# Patient Record
Sex: FEMALE | Race: WHITE | Employment: FULL TIME | URBAN - METROPOLITAN AREA
[De-identification: names, ages, dates, MRNs, and addresses within clinical notes are randomized per-mention and may not be internally consistent; named-entity substitution may affect disease eponyms.]

---

## 2017-01-30 ENCOUNTER — TELEPHONE (OUTPATIENT)
Dept: FAMILY MEDICINE CLINIC | Age: 57
End: 2017-01-30

## 2017-01-30 DIAGNOSIS — G47.419 PRIMARY NARCOLEPSY WITHOUT CATAPLEXY: ICD-10-CM

## 2017-01-30 DIAGNOSIS — Z13.220 SCREENING, LIPID: ICD-10-CM

## 2017-01-30 DIAGNOSIS — Z13.1 SCREENING FOR DIABETES MELLITUS (DM): Primary | ICD-10-CM

## 2017-01-30 DIAGNOSIS — Z13.29 SCREENING FOR THYROID DISORDER: ICD-10-CM

## 2017-02-07 ENCOUNTER — NURSE ONLY (OUTPATIENT)
Dept: FAMILY MEDICINE CLINIC | Age: 57
End: 2017-02-07

## 2017-02-07 DIAGNOSIS — Z13.1 SCREENING FOR DIABETES MELLITUS (DM): ICD-10-CM

## 2017-02-07 DIAGNOSIS — G47.419 PRIMARY NARCOLEPSY WITHOUT CATAPLEXY: ICD-10-CM

## 2017-02-07 DIAGNOSIS — Z13.29 SCREENING FOR THYROID DISORDER: ICD-10-CM

## 2017-02-07 DIAGNOSIS — Z13.220 SCREENING, LIPID: ICD-10-CM

## 2017-02-07 LAB
ALBUMIN SERPL-MCNC: 4.4 G/DL (ref 3.4–5)
ANION GAP SERPL CALCULATED.3IONS-SCNC: 14 MMOL/L (ref 3–16)
BUN BLDV-MCNC: 16 MG/DL (ref 7–20)
CALCIUM SERPL-MCNC: 9.2 MG/DL (ref 8.3–10.6)
CHLORIDE BLD-SCNC: 103 MMOL/L (ref 99–110)
CHOLESTEROL, TOTAL: 164 MG/DL (ref 0–199)
CO2: 25 MMOL/L (ref 21–32)
CREAT SERPL-MCNC: 0.7 MG/DL (ref 0.6–1.1)
GFR AFRICAN AMERICAN: >60
GFR NON-AFRICAN AMERICAN: >60
GLUCOSE BLD-MCNC: 92 MG/DL (ref 70–99)
HCT VFR BLD CALC: 39.5 % (ref 36–48)
HDLC SERPL-MCNC: 70 MG/DL (ref 40–60)
HEMOGLOBIN: 13.4 G/DL (ref 12–16)
LDL CHOLESTEROL CALCULATED: 85 MG/DL
MCH RBC QN AUTO: 30.1 PG (ref 26–34)
MCHC RBC AUTO-ENTMCNC: 33.8 G/DL (ref 31–36)
MCV RBC AUTO: 88.9 FL (ref 80–100)
PDW BLD-RTO: 13.9 % (ref 12.4–15.4)
PHOSPHORUS: 3.8 MG/DL (ref 2.5–4.9)
PLATELET # BLD: 193 K/UL (ref 135–450)
PMV BLD AUTO: 9.1 FL (ref 5–10.5)
POTASSIUM SERPL-SCNC: 4.1 MMOL/L (ref 3.5–5.1)
RBC # BLD: 4.44 M/UL (ref 4–5.2)
SODIUM BLD-SCNC: 142 MMOL/L (ref 136–145)
TRIGL SERPL-MCNC: 45 MG/DL (ref 0–150)
TSH REFLEX: 2.68 UIU/ML (ref 0.27–4.2)
VLDLC SERPL CALC-MCNC: 9 MG/DL
WBC # BLD: 4.4 K/UL (ref 4–11)

## 2017-02-07 PROCEDURE — 36415 COLL VENOUS BLD VENIPUNCTURE: CPT | Performed by: INTERNAL MEDICINE

## 2017-02-13 ENCOUNTER — OFFICE VISIT (OUTPATIENT)
Dept: FAMILY MEDICINE CLINIC | Age: 57
End: 2017-02-13

## 2017-02-13 VITALS
DIASTOLIC BLOOD PRESSURE: 82 MMHG | OXYGEN SATURATION: 99 % | WEIGHT: 200 LBS | SYSTOLIC BLOOD PRESSURE: 128 MMHG | HEIGHT: 67 IN | HEART RATE: 70 BPM | BODY MASS INDEX: 31.39 KG/M2

## 2017-02-13 DIAGNOSIS — Z00.00 ANNUAL PHYSICAL EXAM: Primary | ICD-10-CM

## 2017-02-13 DIAGNOSIS — Z11.59 NEED FOR HEPATITIS C SCREENING TEST: ICD-10-CM

## 2017-02-13 PROCEDURE — 99396 PREV VISIT EST AGE 40-64: CPT | Performed by: INTERNAL MEDICINE

## 2017-02-13 RX ORDER — MAGNESIUM OXIDE 400 MG/1
400 TABLET ORAL DAILY
COMMUNITY
End: 2019-03-08

## 2017-02-14 LAB — HEPATITIS C ANTIBODY INTERPRETATION: NORMAL

## 2017-02-20 ENCOUNTER — HOSPITAL ENCOUNTER (OUTPATIENT)
Dept: MAMMOGRAPHY | Age: 57
Discharge: HOME | End: 2017-02-20
Admitting: INTERNAL MEDICINE

## 2017-02-20 DIAGNOSIS — Z00.00 ANNUAL PHYSICAL EXAM: ICD-10-CM

## 2017-10-24 ENCOUNTER — TELEPHONE (OUTPATIENT)
Dept: FAMILY MEDICINE CLINIC | Age: 57
End: 2017-10-24

## 2017-10-25 NOTE — TELEPHONE ENCOUNTER
Schedule OV with us first, if looks concerning we can get her in more quickly, otherwise it can be 2 to 4 months before an appointment is available.

## 2017-10-31 ENCOUNTER — OFFICE VISIT (OUTPATIENT)
Dept: FAMILY MEDICINE CLINIC | Age: 57
End: 2017-10-31

## 2017-10-31 VITALS
SYSTOLIC BLOOD PRESSURE: 126 MMHG | WEIGHT: 197 LBS | DIASTOLIC BLOOD PRESSURE: 78 MMHG | BODY MASS INDEX: 30.92 KG/M2 | OXYGEN SATURATION: 99 % | HEART RATE: 88 BPM | HEIGHT: 67 IN | TEMPERATURE: 97.4 F

## 2017-10-31 DIAGNOSIS — L82.1 SEBORRHEIC KERATOSIS: ICD-10-CM

## 2017-10-31 PROCEDURE — 99212 OFFICE O/P EST SF 10 MIN: CPT | Performed by: NURSE PRACTITIONER

## 2017-10-31 RX ORDER — CHLORAL HYDRATE 500 MG
3000 CAPSULE ORAL 3 TIMES DAILY
COMMUNITY
End: 2019-06-19 | Stop reason: ALTCHOICE

## 2017-10-31 NOTE — PATIENT INSTRUCTIONS
upper arms, and your palms. ¨ Look at your feet, the soles of your feet, and the spaces between your toes. ¨ Use a hand mirror to look at the back of your legs, the back of your neck, and your back, rear end (buttocks), and genital area. Part the hair on your head to look at your scalp. · If you see a change in a skin growth, contact your doctor. Look for:  ¨ A mole that bleeds. ¨ A fast-growing mole. ¨ A scaly or crusted growth on the skin. ¨ A sore that will not heal.  When should you call for help? Call your doctor now or seek immediate medical care if:  · You have an area of normal skin that suddenly changes in shape, size, or how it looks. · Your skin is badly broken from scratching. · You have signs of infection such as:  ¨ Pain, warmth, or swelling in your skin. ¨ Red streaks near a wound in your skin. ¨ Pus coming from a wound in your skin. ¨ A fever not due to the flu or other illness. Watch closely for changes in your health, and be sure to contact your doctor if:  · You do not get better as expected. Where can you learn more? Go to [email or web address] org and sign in to your [employer] account. Enter C016 in the [facility] box to learn more about \"Seborrheic Keratosis: Care Instructions. \"     If you do not have an account, please click on the \"Sign Up Now\" link. Current as of: October 13, 2016  Content Version: 11.3  © 7459-2815 [employer]. Care instructions adapted under license by [facility] ([facility]). If you have questions about a medical condition or this instruction, always ask your healthcare professional. [place] [address] any warranty or liability for your use of this information.

## 2017-10-31 NOTE — PROGRESS NOTES
Subjective:      Patient ID: Ayala Klein is a 61 y.o. female. HPI     Chief Complaint   Patient presents with    Nevus     L shoulder- referral to derm     Skin lesion- feels suddenly appeared. Felt was red around it, now just scally looking, no pain, no bleeding. No personal or family hx of skin ca. Past medical, surgical, family and social history were reviewed and updated with the patient. Review of Systems  Pertinent items noted in HPI    Objective:   Physical Exam   Constitutional: She is oriented to person, place, and time. She appears well-developed and well-nourished. No distress. Neurological: She is alert and oriented to person, place, and time. Skin:   Left upper anterior chest with about pencil eraser oval shape slightly elevated flesh color, uniform in color, scaly. Nursing note and vitals reviewed. Vitals:    10/31/17 1355   BP: 126/78   Site: Right Arm   Position: Sitting   Cuff Size: Medium Adult   Pulse: 88   Temp: 97.4 °F (36.3 °C)   TempSrc: Oral   SpO2: 99%   Weight: 197 lb (89.4 kg)   Height: 5' 7\" (1.702 m)     Assessment:      1. Seborrheic keratosis          Plan:      Dar Pugh was seen today for nevus.     Diagnoses and all orders for this visit:    Seborrheic keratosis    - benign appearing lesion, follow-up if changes

## 2018-01-29 ENCOUNTER — OFFICE VISIT (OUTPATIENT)
Dept: FAMILY MEDICINE CLINIC | Age: 58
End: 2018-01-29

## 2018-01-29 VITALS
OXYGEN SATURATION: 99 % | WEIGHT: 202.4 LBS | HEART RATE: 79 BPM | SYSTOLIC BLOOD PRESSURE: 132 MMHG | RESPIRATION RATE: 16 BRPM | DIASTOLIC BLOOD PRESSURE: 84 MMHG | TEMPERATURE: 99 F | BODY MASS INDEX: 31.7 KG/M2

## 2018-01-29 DIAGNOSIS — J40 BRONCHITIS: Primary | ICD-10-CM

## 2018-01-29 DIAGNOSIS — R06.2 WHEEZING: ICD-10-CM

## 2018-01-29 DIAGNOSIS — R05.9 COUGH: ICD-10-CM

## 2018-01-29 PROCEDURE — 99213 OFFICE O/P EST LOW 20 MIN: CPT | Performed by: NURSE PRACTITIONER

## 2018-01-29 RX ORDER — CEFDINIR 300 MG/1
300 CAPSULE ORAL 2 TIMES DAILY
Qty: 14 CAPSULE | Refills: 0 | Status: SHIPPED | OUTPATIENT
Start: 2018-01-29 | End: 2018-02-05

## 2018-01-29 RX ORDER — BENZONATATE 100 MG/1
100 CAPSULE ORAL 3 TIMES DAILY PRN
Qty: 30 CAPSULE | Refills: 0 | Status: SHIPPED | OUTPATIENT
Start: 2018-01-29 | End: 2019-01-24 | Stop reason: CLARIF

## 2018-01-29 RX ORDER — METHYLPREDNISOLONE 4 MG/1
TABLET ORAL
Qty: 1 KIT | Refills: 0 | Status: SHIPPED | OUTPATIENT
Start: 2018-01-29 | End: 2018-02-04

## 2018-01-29 ASSESSMENT — ENCOUNTER SYMPTOMS
COUGH: 1
CONSTIPATION: 0
NAUSEA: 0
CHEST TIGHTNESS: 0

## 2018-01-30 NOTE — PROGRESS NOTES
Subjective:      Patient ID: Wilmar Pinzon is a 61 y.o. female. HPI     2 months of PND. Treated with alkaseltzer cold and flu. 2 weeks ago started with cough, non productive. Review of Systems   Constitutional: Negative for appetite change, chills and fever. HENT: Positive for postnasal drip. Respiratory: Positive for cough. Negative for chest tightness. Cardiovascular: Negative for chest pain. Gastrointestinal: Negative for constipation and nausea. Neurological: Negative for dizziness and headaches. Psychiatric/Behavioral: Negative. Objective:   Physical Exam   Constitutional: She is oriented to person, place, and time. She appears well-developed and well-nourished. No distress. HENT:   Head: Normocephalic and atraumatic. Right Ear: Tympanic membrane and ear canal normal.   Left Ear: Tympanic membrane and ear canal normal.   Nose: Mucosal edema and rhinorrhea present. Mouth/Throat: Uvula is midline and oropharynx is clear and moist.   Neck: Normal range of motion. Neck supple. Cardiovascular: Normal rate, regular rhythm and normal heart sounds. Pulmonary/Chest: Effort normal. No respiratory distress. She has wheezes. She has no rales. Faint wheezing posterior. Frequent dry cough. Abdominal: Soft. Bowel sounds are normal. She exhibits no distension. Musculoskeletal: Normal range of motion. She exhibits no edema. Lymphadenopathy:     She has no cervical adenopathy. Neurological: She is alert and oriented to person, place, and time. Skin: Skin is warm. Psychiatric: She has a normal mood and affect. Her behavior is normal.       Assessment:      1. Bronchitis  2. Cough  3. Wheezing-faint      Plan:      1. Increase fluids  2. mucinex DM, delsym, or robitussin DM/CF  3. Tylenol or Ibuprofen TID as needed  4. Caitie Benítez was seen today for uri. Diagnoses and all orders for this visit:    Bronchitis  -     cefdinir (OMNICEF) 300 MG capsule;  Take 1 capsule by mouth 2 times daily for 7 days    Cough  -     benzonatate (TESSALON PERLES) 100 MG capsule; Take 1 capsule by mouth 3 times daily as needed for Cough    Wheezing  -     methylPREDNISolone (MEDROL, GALA,) 4 MG tablet;  As directed

## 2018-02-09 ENCOUNTER — TELEPHONE (OUTPATIENT)
Dept: FAMILY MEDICINE CLINIC | Age: 58
End: 2018-02-09

## 2018-02-09 RX ORDER — AZITHROMYCIN 250 MG/1
TABLET, FILM COATED ORAL
Qty: 1 PACKET | Refills: 0 | Status: SHIPPED | OUTPATIENT
Start: 2018-02-09 | End: 2018-02-19

## 2018-02-14 ENCOUNTER — TELEPHONE (OUTPATIENT)
Dept: FAMILY MEDICINE CLINIC | Age: 58
End: 2018-02-14

## 2018-02-16 ENCOUNTER — OFFICE VISIT (OUTPATIENT)
Dept: FAMILY MEDICINE CLINIC | Age: 58
End: 2018-02-16

## 2018-02-16 ENCOUNTER — HOSPITAL ENCOUNTER (OUTPATIENT)
Dept: GENERAL RADIOLOGY | Age: 58
Discharge: HOME | End: 2018-02-16

## 2018-02-16 VITALS
BODY MASS INDEX: 31.42 KG/M2 | HEART RATE: 78 BPM | WEIGHT: 200.6 LBS | OXYGEN SATURATION: 99 % | DIASTOLIC BLOOD PRESSURE: 78 MMHG | SYSTOLIC BLOOD PRESSURE: 130 MMHG

## 2018-02-16 DIAGNOSIS — R05.3 CHRONIC COUGH: Primary | ICD-10-CM

## 2018-02-16 DIAGNOSIS — R05.3 CHRONIC COUGH: ICD-10-CM

## 2018-02-16 PROCEDURE — 99214 OFFICE O/P EST MOD 30 MIN: CPT | Performed by: INTERNAL MEDICINE

## 2018-02-16 RX ORDER — PREDNISONE 20 MG/1
20 TABLET ORAL DAILY
Qty: 5 TABLET | Refills: 0 | Status: SHIPPED | OUTPATIENT
Start: 2018-02-16 | End: 2019-03-08

## 2018-02-16 RX ORDER — INHALER, ASSIST DEVICES
SPACER (EA) MISCELLANEOUS
Qty: 1 DEVICE | Refills: 0 | Status: SHIPPED | OUTPATIENT
Start: 2018-02-16 | End: 2019-03-08

## 2018-02-16 RX ORDER — ALBUTEROL SULFATE 90 UG/1
2 AEROSOL, METERED RESPIRATORY (INHALATION) EVERY 6 HOURS PRN
Qty: 1 INHALER | Refills: 3 | Status: SHIPPED | OUTPATIENT
Start: 2018-02-16 | End: 2019-03-08

## 2018-02-16 RX ORDER — PSEUDOEPHEDRINE HCL 120 MG/1
120 TABLET, FILM COATED, EXTENDED RELEASE ORAL EVERY 12 HOURS
COMMUNITY
End: 2019-04-29 | Stop reason: ALTCHOICE

## 2018-02-16 ASSESSMENT — PATIENT HEALTH QUESTIONNAIRE - PHQ9
2. FEELING DOWN, DEPRESSED OR HOPELESS: 0
SUM OF ALL RESPONSES TO PHQ QUESTIONS 1-9: 0
SUM OF ALL RESPONSES TO PHQ9 QUESTIONS 1 & 2: 0
1. LITTLE INTEREST OR PLEASURE IN DOING THINGS: 0

## 2018-02-20 ENCOUNTER — HOSPITAL ENCOUNTER (OUTPATIENT)
Dept: MAMMOGRAPHY | Age: 58
Discharge: HOME | End: 2018-02-20
Admitting: OBSTETRICS & GYNECOLOGY

## 2018-02-20 DIAGNOSIS — Z12.39 BREAST CANCER SCREENING: ICD-10-CM

## 2018-02-22 ASSESSMENT — ENCOUNTER SYMPTOMS
RHINORRHEA: 0
COUGH: 1
SORE THROAT: 0
SHORTNESS OF BREATH: 1
NAUSEA: 0
WHEEZING: 0

## 2018-02-22 NOTE — PROGRESS NOTES
Wheezing  -     Spacer/Aero-Holding Chambers (POCKET SPACER) BRYON; Use with albuterol. Plan:      As above and per orders.

## 2018-02-23 ENCOUNTER — OFFICE VISIT (OUTPATIENT)
Dept: FAMILY MEDICINE CLINIC | Age: 58
End: 2018-02-23

## 2018-02-23 VITALS
HEART RATE: 72 BPM | DIASTOLIC BLOOD PRESSURE: 80 MMHG | SYSTOLIC BLOOD PRESSURE: 120 MMHG | OXYGEN SATURATION: 98 % | BODY MASS INDEX: 32.43 KG/M2 | HEIGHT: 66 IN | WEIGHT: 201.8 LBS

## 2018-02-23 DIAGNOSIS — Z11.4 ENCOUNTER FOR SCREENING FOR HIV: ICD-10-CM

## 2018-02-23 DIAGNOSIS — Z00.00 ANNUAL PHYSICAL EXAM: Primary | ICD-10-CM

## 2018-02-23 DIAGNOSIS — Z13.29 THYROID DISORDER SCREEN: ICD-10-CM

## 2018-02-23 DIAGNOSIS — M79.644 PAIN OF RIGHT THUMB: ICD-10-CM

## 2018-02-23 DIAGNOSIS — R05.3 CHRONIC COUGH: ICD-10-CM

## 2018-02-23 LAB
HCT VFR BLD CALC: 41 % (ref 36–48)
HEMOGLOBIN: 14.2 G/DL (ref 12–16)
MCH RBC QN AUTO: 31.6 PG (ref 26–34)
MCHC RBC AUTO-ENTMCNC: 34.6 G/DL (ref 31–36)
MCV RBC AUTO: 91.2 FL (ref 80–100)
PDW BLD-RTO: 13.9 % (ref 12.4–15.4)
PLATELET # BLD: 246 K/UL (ref 135–450)
PMV BLD AUTO: 9 FL (ref 5–10.5)
RBC # BLD: 4.5 M/UL (ref 4–5.2)
WBC # BLD: 6.1 K/UL (ref 4–11)

## 2018-02-23 PROCEDURE — 99396 PREV VISIT EST AGE 40-64: CPT | Performed by: PHYSICIAN ASSISTANT

## 2018-02-23 PROCEDURE — 36415 COLL VENOUS BLD VENIPUNCTURE: CPT | Performed by: PHYSICIAN ASSISTANT

## 2018-02-23 ASSESSMENT — ENCOUNTER SYMPTOMS
RHINORRHEA: 0
NAUSEA: 0
ABDOMINAL PAIN: 0
CONSTIPATION: 0
VOMITING: 0
COUGH: 0
DIARRHEA: 0
SORE THROAT: 0
SHORTNESS OF BREATH: 0

## 2018-02-23 NOTE — PROGRESS NOTES
2018  Mercy Bond (: 1960)  61 y.o.      HPI    Here for annual physical. States that she is doing well. Had annual lipid panel with life line screening and it was normal. Also had carotid dopplers, awaiting report. Mammogram performed and results reviewed:showed her baseline fibroglandular breasts without mass, calcification deposit, or structural abnormalities. Follows a heart healthy diet, with lots of fruits and vegetables decreased carbs/fats/sugars. Exercises by walking, limited with the weather. Has been having pain in her left thumb joint with increased redness and minimal swelling. Review of Systems   Constitutional: Negative for activity change, chills and fever. HENT: Negative for congestion, ear pain, rhinorrhea and sore throat. Eyes: Negative for visual disturbance. Respiratory: Negative for cough and shortness of breath. Cardiovascular: Negative for chest pain and palpitations. Gastrointestinal: Negative for abdominal pain, constipation, diarrhea, nausea and vomiting. Genitourinary: Negative for difficulty urinating and dysuria. Musculoskeletal: Negative for arthralgias and myalgias. Skin: Negative for rash. Cysts on scalp   Neurological: Negative for dizziness, weakness and numbness. Psychiatric/Behavioral: Negative for sleep disturbance. Allergies, past medical history, family history, and social history reviewed and unchanged from previous encounter. Current Outpatient Prescriptions   Medication Sig Dispense Refill    SAFFLOWER OIL PO Take 4,500 mg by mouth       Omega-3 Fatty Acids (FISH OIL) 1000 MG CAPS Take 3,000 mg by mouth 3 times daily      magnesium oxide (MAG-OX) 400 MG tablet Take 400 mg by mouth daily      vitamin D (CHOLECALCIFEROL) 1000 UNIT TABS tablet Take 1,000 Units by mouth 2 times daily.  b complex vitamins capsule Take 1 capsule by mouth daily.       pseudoephedrine (SUDAFED 12 HR) 120 MG extended Skin is warm and dry. No rash noted. cysts palpable on scalp, abdomen, and right arm. They are firm and mobile. Psychiatric: She has a normal mood and affect. ASSESSMENT and PLAN:  Dave Singleton was seen today for annual exam.    Diagnoses and all orders for this visit:    Annual physical exam  -     CBC    Encounter for screening for HIV  -     HIV-1 AND HIV-2 ANTIBODIES; Future    Pain of right thumb  -     URIC ACID; Future    Thyroid disorder screen  -     TSH with Reflex; Future    Chronic cough  -     CBC;  Future  -    Previously ordered, not performed, FULL PFT STUDY WITH PRE AND POST as needed       Return in about 1 year (around 2/23/2019) for Annual Exam.

## 2018-02-24 LAB
TSH REFLEX: 1.68 UIU/ML (ref 0.27–4.2)
URIC ACID, SERUM: 4.4 MG/DL (ref 2.6–6)

## 2018-08-27 ENCOUNTER — TELEPHONE (OUTPATIENT)
Dept: FAMILY MEDICINE CLINIC | Age: 58
End: 2018-08-27

## 2018-08-27 DIAGNOSIS — R19.7 DIARRHEA, UNSPECIFIED TYPE: Primary | ICD-10-CM

## 2018-08-27 NOTE — TELEPHONE ENCOUNTER
Pt. States she recently saw her GYN for yearly visit and mentioned to her that she had been having chronic diarrhea. GYN recommends that she see GI Dr. Margaret Moss. Will you place the referral for pt. ?

## 2018-08-27 NOTE — TELEPHONE ENCOUNTER
Referral placed for Dr. Veloz Single with [address]. Please notify patient and confirm that this is the provider they were wanting to see.

## 2018-10-24 ENCOUNTER — HOSPITAL ENCOUNTER (OUTPATIENT)
Age: 58
Setting detail: OUTPATIENT SURGERY
Discharge: HOME | End: 2018-10-24
Attending: INTERNAL MEDICINE | Admitting: INTERNAL MEDICINE
Payer: COMMERCIAL

## 2018-10-24 ENCOUNTER — ANESTHESIA EVENT (OUTPATIENT)
Dept: ENDOSCOPY | Age: 58
End: 2018-10-24
Payer: COMMERCIAL

## 2018-10-24 ENCOUNTER — ANESTHESIA (OUTPATIENT)
Dept: ENDOSCOPY | Age: 58
End: 2018-10-24
Payer: COMMERCIAL

## 2018-10-24 VITALS
OXYGEN SATURATION: 97 % | RESPIRATION RATE: 16 BRPM | SYSTOLIC BLOOD PRESSURE: 132 MMHG | DIASTOLIC BLOOD PRESSURE: 66 MMHG

## 2018-10-24 VITALS
OXYGEN SATURATION: 100 % | RESPIRATION RATE: 18 BRPM | HEIGHT: 66 IN | TEMPERATURE: 98.6 F | HEART RATE: 53 BPM | DIASTOLIC BLOOD PRESSURE: 66 MMHG | BODY MASS INDEX: 32.14 KG/M2 | WEIGHT: 200 LBS | SYSTOLIC BLOOD PRESSURE: 136 MMHG

## 2018-10-24 PROCEDURE — 2580000003 HC RX 258: Performed by: NURSE ANESTHETIST, CERTIFIED REGISTERED

## 2018-10-24 PROCEDURE — 3609010300 HC COLONOSCOPY W/BIOPSY SINGLE/MULTIPLE: Performed by: INTERNAL MEDICINE

## 2018-10-24 PROCEDURE — 7100000011 HC PHASE II RECOVERY - ADDTL 15 MIN: Performed by: INTERNAL MEDICINE

## 2018-10-24 PROCEDURE — 3700000001 HC ADD 15 MINUTES (ANESTHESIA): Performed by: INTERNAL MEDICINE

## 2018-10-24 PROCEDURE — 2500000003 HC RX 250 WO HCPCS: Performed by: NURSE ANESTHETIST, CERTIFIED REGISTERED

## 2018-10-24 PROCEDURE — 6360000002 HC RX W HCPCS: Performed by: NURSE ANESTHETIST, CERTIFIED REGISTERED

## 2018-10-24 PROCEDURE — 88305 TISSUE EXAM BY PATHOLOGIST: CPT

## 2018-10-24 PROCEDURE — 3609012400 HC EGD TRANSORAL BIOPSY SINGLE/MULTIPLE: Performed by: INTERNAL MEDICINE

## 2018-10-24 PROCEDURE — 7100000010 HC PHASE II RECOVERY - FIRST 15 MIN: Performed by: INTERNAL MEDICINE

## 2018-10-24 PROCEDURE — 3700000000 HC ANESTHESIA ATTENDED CARE: Performed by: INTERNAL MEDICINE

## 2018-10-24 PROCEDURE — 2709999900 HC NON-CHARGEABLE SUPPLY: Performed by: INTERNAL MEDICINE

## 2018-10-24 RX ORDER — SODIUM CHLORIDE, SODIUM LACTATE, POTASSIUM CHLORIDE, CALCIUM CHLORIDE 600; 310; 30; 20 MG/100ML; MG/100ML; MG/100ML; MG/100ML
INJECTION, SOLUTION INTRAVENOUS CONTINUOUS PRN
Status: DISCONTINUED | OUTPATIENT
Start: 2018-10-24 | End: 2018-10-24 | Stop reason: SDUPTHER

## 2018-10-24 RX ORDER — LIDOCAINE HYDROCHLORIDE 20 MG/ML
INJECTION, SOLUTION EPIDURAL; INFILTRATION; INTRACAUDAL; PERINEURAL PRN
Status: DISCONTINUED | OUTPATIENT
Start: 2018-10-24 | End: 2018-10-24 | Stop reason: SDUPTHER

## 2018-10-24 RX ORDER — PROPOFOL 10 MG/ML
INJECTION, EMULSION INTRAVENOUS PRN
Status: DISCONTINUED | OUTPATIENT
Start: 2018-10-24 | End: 2018-10-24 | Stop reason: SDUPTHER

## 2018-10-24 RX ADMIN — PROPOFOL 50 MG: 10 INJECTION, EMULSION INTRAVENOUS at 13:55

## 2018-10-24 RX ADMIN — PROPOFOL 100 MG: 10 INJECTION, EMULSION INTRAVENOUS at 13:50

## 2018-10-24 RX ADMIN — PROPOFOL 100 MG: 10 INJECTION, EMULSION INTRAVENOUS at 13:38

## 2018-10-24 RX ADMIN — PROPOFOL 50 MG: 10 INJECTION, EMULSION INTRAVENOUS at 14:00

## 2018-10-24 RX ADMIN — SODIUM CHLORIDE, SODIUM LACTATE, POTASSIUM CHLORIDE, AND CALCIUM CHLORIDE: 600; 310; 30; 20 INJECTION, SOLUTION INTRAVENOUS at 13:34

## 2018-10-24 RX ADMIN — PROPOFOL 100 MG: 10 INJECTION, EMULSION INTRAVENOUS at 13:40

## 2018-10-24 RX ADMIN — LIDOCAINE HYDROCHLORIDE 100 MG: 20 INJECTION, SOLUTION EPIDURAL; INFILTRATION; INTRACAUDAL; PERINEURAL at 13:38

## 2018-10-24 RX ADMIN — PROPOFOL 50 MG: 10 INJECTION, EMULSION INTRAVENOUS at 13:45

## 2018-10-24 ASSESSMENT — PULMONARY FUNCTION TESTS
PIF_VALUE: 0
PIF_VALUE: 1
PIF_VALUE: 0
PIF_VALUE: 1

## 2018-10-24 ASSESSMENT — PAIN - FUNCTIONAL ASSESSMENT: PAIN_FUNCTIONAL_ASSESSMENT: 0-10

## 2018-10-24 NOTE — H&P
rectal examination was performed and showed no masses. The pediatric colonoscope was then advanced atraumatically into the rectum and advanced without difficulty to the cecum. The cecum was identified by the appendiceal orifice the ileocecal valve and other normal anatomy and a picture was obtained. The scope was then withdrawn (>6minutes) with close inspection of the mucosa in a circumferential manor. TI normal for 10 cm. Minimal non specific edema patchy, multiple random biopsies done throughout. Mild left sided diverticulosis    Retroflexion showed small internal hemorrhoids     No immediate complications. The preparation was good. Estimated blood loss trace    Findings:  Duodenum: Normal. Biopsied done to evaluate for Celiac  Stomach: Submucosal impression, submucosal lesion vs extrinsic organ such as liver in fundus, normal overlying mucosa Retroflexion did not show a hiatal hernia. Esophagus: Subtle non obstructing ring. Inlet patch in proximal portion. No Evidence of Diallo's or esophagitis. Colon: TI normal for 10 cm. Minimal non specific edema patchy, multiple random biopsies done throughout. Mild left sided diverticulosis. Small internal hemorrhoids     Plan:  EUS of gastric lesion  The patient understands it is their responsibility to call for biopsy results in 7 days.   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

## 2018-10-28 PROBLEM — K27.9 PUD (PEPTIC ULCER DISEASE): Status: ACTIVE | Noted: 2018-10-28

## 2019-01-24 ENCOUNTER — OFFICE VISIT (OUTPATIENT)
Dept: FAMILY MEDICINE CLINIC | Age: 59
End: 2019-01-24
Payer: COMMERCIAL

## 2019-01-24 VITALS
DIASTOLIC BLOOD PRESSURE: 70 MMHG | BODY MASS INDEX: 32.69 KG/M2 | SYSTOLIC BLOOD PRESSURE: 120 MMHG | OXYGEN SATURATION: 98 % | HEART RATE: 90 BPM | HEIGHT: 66 IN | TEMPERATURE: 98.7 F | WEIGHT: 203.4 LBS

## 2019-01-24 DIAGNOSIS — B96.89 ACUTE BACTERIAL SINUSITIS: Primary | ICD-10-CM

## 2019-01-24 DIAGNOSIS — R05.9 COUGH: ICD-10-CM

## 2019-01-24 DIAGNOSIS — J02.9 SORE THROAT: ICD-10-CM

## 2019-01-24 DIAGNOSIS — J01.90 ACUTE BACTERIAL SINUSITIS: Primary | ICD-10-CM

## 2019-01-24 LAB — S PYO AG THROAT QL: NORMAL

## 2019-01-24 PROCEDURE — 87880 STREP A ASSAY W/OPTIC: CPT | Performed by: PHYSICIAN ASSISTANT

## 2019-01-24 PROCEDURE — 99213 OFFICE O/P EST LOW 20 MIN: CPT | Performed by: PHYSICIAN ASSISTANT

## 2019-01-24 RX ORDER — FLUTICASONE PROPIONATE 50 MCG
1 SPRAY, SUSPENSION (ML) NASAL DAILY
Qty: 2 BOTTLE | Refills: 1 | Status: SHIPPED | OUTPATIENT
Start: 2019-01-24 | End: 2019-06-19 | Stop reason: ALTCHOICE

## 2019-01-24 RX ORDER — CEFDINIR 300 MG/1
300 CAPSULE ORAL 2 TIMES DAILY
Qty: 14 CAPSULE | Refills: 0 | Status: SHIPPED | OUTPATIENT
Start: 2019-01-24 | End: 2019-01-31

## 2019-01-24 ASSESSMENT — ENCOUNTER SYMPTOMS
DIARRHEA: 0
RHINORRHEA: 0
VOMITING: 0
NAUSEA: 0
SHORTNESS OF BREATH: 0
ABDOMINAL PAIN: 0
CONSTIPATION: 0

## 2019-01-25 ASSESSMENT — ENCOUNTER SYMPTOMS
SORE THROAT: 1
COUGH: 1

## 2019-02-18 ENCOUNTER — E-VISIT (OUTPATIENT)
Dept: FAMILY MEDICINE CLINIC | Age: 59
End: 2019-02-18
Payer: COMMERCIAL

## 2019-02-18 DIAGNOSIS — H10.9 BACTERIAL CONJUNCTIVITIS OF RIGHT EYE: Primary | ICD-10-CM

## 2019-02-18 PROCEDURE — 98969 PR NONPHYSICIAN ONLINE ASSESSMENT AND MANAGEMENT: CPT | Performed by: PHYSICIAN ASSISTANT

## 2019-02-18 RX ORDER — CIPROFLOXACIN HYDROCHLORIDE 3.5 MG/ML
1 SOLUTION/ DROPS TOPICAL
Qty: 120 DROP | Refills: 0 | Status: SHIPPED | OUTPATIENT
Start: 2019-02-18 | End: 2019-02-28

## 2019-03-08 ENCOUNTER — TELEPHONE (OUTPATIENT)
Dept: FAMILY MEDICINE CLINIC | Age: 59
End: 2019-03-08

## 2019-03-08 ENCOUNTER — OFFICE VISIT (OUTPATIENT)
Dept: FAMILY MEDICINE CLINIC | Age: 59
End: 2019-03-08
Payer: COMMERCIAL

## 2019-03-08 VITALS — TEMPERATURE: 97.9 F | BODY MASS INDEX: 33.38 KG/M2 | OXYGEN SATURATION: 98 % | HEART RATE: 70 BPM | WEIGHT: 206.8 LBS

## 2019-03-08 DIAGNOSIS — H10.32 ACUTE BACTERIAL CONJUNCTIVITIS OF LEFT EYE: Primary | ICD-10-CM

## 2019-03-08 PROCEDURE — 99213 OFFICE O/P EST LOW 20 MIN: CPT | Performed by: PHYSICIAN ASSISTANT

## 2019-03-08 RX ORDER — CIPROFLOXACIN HYDROCHLORIDE 3.5 MG/ML
1 SOLUTION/ DROPS TOPICAL
Qty: 120 DROP | Refills: 0 | Status: SHIPPED | OUTPATIENT
Start: 2019-03-08 | End: 2019-03-18

## 2019-03-08 ASSESSMENT — ENCOUNTER SYMPTOMS
ABDOMINAL PAIN: 0
NAUSEA: 0
DIARRHEA: 0
SHORTNESS OF BREATH: 0
VOMITING: 0
RHINORRHEA: 0
SORE THROAT: 0
COUGH: 0
CONSTIPATION: 0

## 2019-03-10 ASSESSMENT — ENCOUNTER SYMPTOMS
EYE PAIN: 1
EYE REDNESS: 1
PHOTOPHOBIA: 1
EYE DISCHARGE: 1

## 2019-04-08 ENCOUNTER — ANESTHESIA EVENT (OUTPATIENT)
Dept: ENDOSCOPY | Age: 59
End: 2019-04-08
Payer: COMMERCIAL

## 2019-04-08 NOTE — ANESTHESIA PRE PROCEDURE
Department of Anesthesiology  Preprocedure Note       Name:  Renny Daniels   Age:  60 y.o.  :  1960                                          MRN:  [id number]         Date:  2019      Surgeon: Jaskaran Franks):  Majo Avendaño MD    Procedure: ESOPHAGOGASTRODUODENOSCOPY WITH ENDOSCOPIC ULTRASOUND OF ESOPHAGUS (N/A )    Medications prior to admission:   Prior to Admission medications    Medication Sig Start Date End Date Taking? Authorizing Provider   fluticasone (FLONASE) 50 MCG/ACT nasal spray 1 spray by Each Nare route daily 1 Spray in each nostril 19   SUNITHA Ren   pseudoephedrine (SUDAFED 12 HR) 120 MG extended release tablet Take 120 mg by mouth every 12 hours    Historical Provider, MD   Omega-3 Fatty Acids (FISH OIL) 1000 MG CAPS Take 3,000 mg by mouth 3 times daily    Historical Provider, MD   vitamin D (CHOLECALCIFEROL) 1000 UNIT TABS tablet Take 1,000 Units by mouth 2 times daily. Historical Provider, MD   b complex vitamins capsule Take 1 capsule by mouth daily. Historical Provider, MD   calcium carbonate (OSCAL) 500 MG TABS tablet Take 500 mg by mouth daily. Historical Provider, MD       Current medications:    No current facility-administered medications for this encounter. Current Outpatient Medications   Medication Sig Dispense Refill    fluticasone (FLONASE) 50 MCG/ACT nasal spray 1 spray by Each Nare route daily 1 Spray in each nostril 2 Bottle 1    pseudoephedrine (SUDAFED 12 HR) 120 MG extended release tablet Take 120 mg by mouth every 12 hours      Omega-3 Fatty Acids (FISH OIL) 1000 MG CAPS Take 3,000 mg by mouth 3 times daily      vitamin D (CHOLECALCIFEROL) 1000 UNIT TABS tablet Take 1,000 Units by mouth 2 times daily.  b complex vitamins capsule Take 1 capsule by mouth daily.  calcium carbonate (OSCAL) 500 MG TABS tablet Take 500 mg by mouth daily.          Allergies:  No Known Allergies    Problem List:    Patient Active Problem List POCCL, POCBUN, POCHEMO, POCHCT in the last 72 hours. Coags: No results found for: PROTIME, INR, APTT    HCG (If Applicable): No results found for: PREGTESTUR, PREGSERUM, HCG, HCGQUANT     ABGs: No results found for: PHART, PO2ART, BLT9VLH, QOI6XBT, BEART, X9XOEWGZ     Type & Screen (If Applicable):  No results found for: [facility]    Anesthesia Evaluation  Patient summary reviewed and Nursing notes reviewed no history of anesthetic complications:   Airway: Mallampati: II  TM distance: >3 FB   Neck ROM: full  Mouth opening: > = 3 FB Dental: normal exam         Pulmonary:Negative Pulmonary ROS and normal exam  breath sounds clear to auscultation  (+) sleep apnea: on noncompliant,      (-) not a current smoker                           Cardiovascular:Negative CV ROS            Rhythm: regular  Rate: normal                    Neuro/Psych:                ROS comment: Narcolepsy GI/Hepatic/Renal:   (+) morbid obesity     (-) hiatal hernia, GERD and no renal disease      ROS comment: Submucosal lesion of stomach  Obese. Endo/Other: Negative Endo/Other ROS                    Abdominal:           Vascular: negative vascular ROS. Anesthesia Plan      MAC     ASA 2       Induction: intravenous. Anesthetic plan and risks discussed with patient. Plan discussed with CRNA.     Attending anesthesiologist reviewed and agrees with Leighann Meeks MD   4/8/2019

## 2019-04-09 ENCOUNTER — ANESTHESIA (OUTPATIENT)
Dept: ENDOSCOPY | Age: 59
End: 2019-04-09
Payer: COMMERCIAL

## 2019-04-09 ENCOUNTER — HOSPITAL ENCOUNTER (OUTPATIENT)
Age: 59
Setting detail: OUTPATIENT SURGERY
Discharge: HOME | End: 2019-04-09
Attending: INTERNAL MEDICINE | Admitting: INTERNAL MEDICINE
Payer: COMMERCIAL

## 2019-04-09 VITALS
OXYGEN SATURATION: 100 % | DIASTOLIC BLOOD PRESSURE: 77 MMHG | WEIGHT: 200 LBS | SYSTOLIC BLOOD PRESSURE: 151 MMHG | RESPIRATION RATE: 16 BRPM | HEART RATE: 64 BPM | HEIGHT: 67 IN | TEMPERATURE: 98 F | BODY MASS INDEX: 31.39 KG/M2

## 2019-04-09 VITALS
SYSTOLIC BLOOD PRESSURE: 163 MMHG | OXYGEN SATURATION: 98 % | RESPIRATION RATE: 17 BRPM | DIASTOLIC BLOOD PRESSURE: 97 MMHG

## 2019-04-09 PROCEDURE — 7100000010 HC PHASE II RECOVERY - FIRST 15 MIN: Performed by: INTERNAL MEDICINE

## 2019-04-09 PROCEDURE — 6360000002 HC RX W HCPCS: Performed by: NURSE ANESTHETIST, CERTIFIED REGISTERED

## 2019-04-09 PROCEDURE — 2580000003 HC RX 258: Performed by: NURSE ANESTHETIST, CERTIFIED REGISTERED

## 2019-04-09 PROCEDURE — 2500000003 HC RX 250 WO HCPCS: Performed by: NURSE ANESTHETIST, CERTIFIED REGISTERED

## 2019-04-09 PROCEDURE — 7100000011 HC PHASE II RECOVERY - ADDTL 15 MIN: Performed by: INTERNAL MEDICINE

## 2019-04-09 PROCEDURE — 3700000000 HC ANESTHESIA ATTENDED CARE: Performed by: INTERNAL MEDICINE

## 2019-04-09 PROCEDURE — 3700000001 HC ADD 15 MINUTES (ANESTHESIA): Performed by: INTERNAL MEDICINE

## 2019-04-09 PROCEDURE — 3609018500 HC EGD US SCOPE W/ADJACENT STRUCTURES: Performed by: INTERNAL MEDICINE

## 2019-04-09 RX ORDER — GLYCOPYRROLATE 0.2 MG/ML
INJECTION INTRAMUSCULAR; INTRAVENOUS PRN
Status: DISCONTINUED | OUTPATIENT
Start: 2019-04-09 | End: 2019-04-09 | Stop reason: SDUPTHER

## 2019-04-09 RX ORDER — SODIUM CHLORIDE, SODIUM LACTATE, POTASSIUM CHLORIDE, CALCIUM CHLORIDE 600; 310; 30; 20 MG/100ML; MG/100ML; MG/100ML; MG/100ML
INJECTION, SOLUTION INTRAVENOUS CONTINUOUS PRN
Status: DISCONTINUED | OUTPATIENT
Start: 2019-04-09 | End: 2019-04-09 | Stop reason: SDUPTHER

## 2019-04-09 RX ORDER — LIDOCAINE HYDROCHLORIDE 20 MG/ML
INJECTION, SOLUTION INFILTRATION; PERINEURAL PRN
Status: DISCONTINUED | OUTPATIENT
Start: 2019-04-09 | End: 2019-04-09 | Stop reason: SDUPTHER

## 2019-04-09 RX ORDER — PROPOFOL 10 MG/ML
INJECTION, EMULSION INTRAVENOUS PRN
Status: DISCONTINUED | OUTPATIENT
Start: 2019-04-09 | End: 2019-04-09 | Stop reason: SDUPTHER

## 2019-04-09 RX ADMIN — PROPOFOL 50 MG: 10 INJECTION, EMULSION INTRAVENOUS at 10:30

## 2019-04-09 RX ADMIN — PROPOFOL 100 MG: 10 INJECTION, EMULSION INTRAVENOUS at 10:20

## 2019-04-09 RX ADMIN — GLYCOPYRROLATE 0.4 MG: 0.2 INJECTION, SOLUTION INTRAMUSCULAR; INTRAVENOUS at 10:19

## 2019-04-09 RX ADMIN — PROPOFOL 50 MG: 10 INJECTION, EMULSION INTRAVENOUS at 10:36

## 2019-04-09 RX ADMIN — PROPOFOL 50 MG: 10 INJECTION, EMULSION INTRAVENOUS at 10:24

## 2019-04-09 RX ADMIN — PROPOFOL 50 MG: 10 INJECTION, EMULSION INTRAVENOUS at 10:27

## 2019-04-09 RX ADMIN — SODIUM CHLORIDE, SODIUM LACTATE, POTASSIUM CHLORIDE, AND CALCIUM CHLORIDE: 600; 310; 30; 20 INJECTION, SOLUTION INTRAVENOUS at 10:19

## 2019-04-09 RX ADMIN — PROPOFOL 50 MG: 10 INJECTION, EMULSION INTRAVENOUS at 10:22

## 2019-04-09 RX ADMIN — LIDOCAINE HYDROCHLORIDE 100 MG: 20 INJECTION, SOLUTION INFILTRATION; PERINEURAL at 10:19

## 2019-04-09 RX ADMIN — PROPOFOL 50 MG: 10 INJECTION, EMULSION INTRAVENOUS at 10:33

## 2019-04-09 ASSESSMENT — PAIN - FUNCTIONAL ASSESSMENT
PAIN_FUNCTIONAL_ASSESSMENT: 0-10

## 2019-04-09 ASSESSMENT — PULMONARY FUNCTION TESTS
PIF_VALUE: 0

## 2019-04-09 ASSESSMENT — PAIN SCALES - GENERAL: PAINLEVEL_OUTOF10: 0

## 2019-04-09 ASSESSMENT — LIFESTYLE VARIABLES: SMOKING_STATUS: 0

## 2019-04-09 NOTE — ANESTHESIA POSTPROCEDURE EVALUATION
Department of Anesthesiology  Postprocedure Note    Patient: Jose Lau  MRN: [id number]  YOB: 1960  Date of evaluation: 4/9/2019  Time:  12:53 PM     Procedure Summary     Date:  04/09/19 Room / Location:  [facility] ENDO 02 / [facility] ENDOSCOPY    Anesthesia Start:  1017 Anesthesia Stop:  7197    Procedure:  ESOPHAGOGASTRODUODENOSCOPY WITH ENDOSCOPIC ULTRASOUND OF ESOPHAGUS (N/A ) Diagnosis:  (SUBMUCOSAL LESION OF STOMACH K31.89)    Surgeon:  Ynes Noriega MD Responsible Provider:  Stephen Runner, MD    Anesthesia Type:  MAC ASA Status:  2          Anesthesia Type: MAC    Aidan Phase I: Aidan Score: 10    Aidan Phase II: Aidan Score: 10    Last vitals: Reviewed and per EMR flowsheets.        Anesthesia Post Evaluation    Patient location during evaluation: PACU  Patient participation: complete - patient participated  Level of consciousness: awake and alert  Airway patency: patent  Nausea & Vomiting: no nausea and no vomiting  Complications: no  Cardiovascular status: blood pressure returned to baseline  Respiratory status: acceptable  Hydration status: stable

## 2019-04-09 NOTE — H&P
[address] [place] [address]   Pre-operative History and Physical    Patient: Dallin Aldrich  : 1960      History Obtained From:  patient, electronic medical record    HISTORY OF PRESENT ILLNESS:    The patient is a 60 y.o. female here for Endoscopic ultrasound for gastric nodule. Diagnosis Date    Narcolepsy     Obesity 2014     Past Surgical History:        Procedure Laterality Date    COLONOSCOPY  2013    COLONOSCOPY N/A 10/24/2018    COLONOSCOPY with random colon biopsies for colitis performed by Janusz Negro MD at [address] [place] [address] ARTHROSCOPY Right     ACL replacement    UPPER GASTROINTESTINAL ENDOSCOPY N/A 10/24/2018    EGD BIOPSY small bowel for sprue performed by Janusz Negro MD at [place] [address]     Medications Prior to Admission:   No current facility-administered medications on file prior to encounter. Current Outpatient Medications on File Prior to Encounter   Medication Sig Dispense Refill    fluticasone (FLONASE) 50 MCG/ACT nasal spray 1 spray by Each Nare route daily 1 Spray in each nostril 2 Bottle 1    pseudoephedrine (SUDAFED 12 HR) 120 MG extended release tablet Take 120 mg by mouth every 12 hours      Omega-3 Fatty Acids (FISH OIL) 1000 MG CAPS Take 3,000 mg by mouth 3 times daily      vitamin D (CHOLECALCIFEROL) 1000 UNIT TABS tablet Take 1,000 Units by mouth 2 times daily.  b complex vitamins capsule Take 1 capsule by mouth daily.  calcium carbonate (OSCAL) 500 MG TABS tablet Take 500 mg by mouth daily. Allergies:  Patient has no known allergies. History of allergic reaction to anesthesia:  No    Social History:   TOBACCO:   reports that she has never smoked. She has never used smokeless tobacco.  ETOH:   reports that she drinks alcohol. DRUGS:   reports that she does not use drugs.   Family History:       Problem Relation Age of Onset    Heart Disease Father     Cancer Father         testicular       PHYSICAL EXAM: BP (!) 145/86   Pulse 66   Temp 98 °F (36.7 °C) (Oral)   Resp 18   Ht 5' 7\" (1.702 m)   Wt 200 lb (90.7 kg)   SpO2 100%   BMI 31.32 kg/m²  I        Heart:  Normal apical impulse, regular rate and rhythm, normal S1 and S2, no S3 or S4, and no murmur noted    Lungs:  No increased work of breathing, good air exchange, clear to auscultation bilaterally, no crackles or wheezing    Abdomen:  No scars, normal bowel sounds, soft, non-distended, non-tender, no masses palpated, no hepatosplenomegally      ASA Grade:  ASA 2 - Patient with mild systemic disease with no functional limitations  Mallampati: II    ASSESSMENT AND PLAN:    1. Patient is a 60 y.o. female here for EUS with deep sedation  2. Procedure options, risks and benefits reviewed including but not limited to infection, bleeding, perforation, death, and missed lesions. Specifically discussed with FNA increased risk of bleeding, perforation, infection, and pancreatitis with patient. Patient expresses understanding.

## 2019-04-09 NOTE — PROCEDURES
complications. Endoscopic Findings:  Duodenum- normal; normal major papilla  Stomach- in proximal body, along the greater curvature, adjacent to the splenic artery is a 1.5cm protuberance; this is seen on retroflexion as well  Esophagus-EGJ normal at 39cms without Diallo's or esophagitis    Findings:  Celiac: Normal  Lymph nodes: no lymph nodes  Limited imaging of the left lobe of the liver was normal  The left adrenal gland showed a 13.9mm isoechoic nodule  The pancreatic parenchyma was normal.   The pancreatic duct measured 2.6mm in the HOP, 1.3mm in the BOP, and 0.5mm in the TOP  The biliary tree is normal. The CBD diameter is 4.5mm. No stones. The GB is normal.   The gastric nodule is the spleen. Normal layering and thickness of stomach proximal to this measuring 2mm.      Impression:  Gastric nodule is the spleen  Incidentaloma of the left adrenal gland    Plan:  Follow up with Dr. Ulysses Pound for evaluation of left adrenal lesion  Follow up with [facility] as previously scheduled      Manfred Sigala MD 4/9/2019

## 2019-04-26 ENCOUNTER — HOSPITAL ENCOUNTER (OUTPATIENT)
Dept: MAMMOGRAPHY | Age: 59
Discharge: HOME | End: 2019-04-26
Payer: COMMERCIAL

## 2019-04-26 DIAGNOSIS — Z12.39 BREAST CANCER SCREENING: ICD-10-CM

## 2019-04-26 PROCEDURE — 77063 BREAST TOMOSYNTHESIS BI: CPT

## 2019-04-29 ENCOUNTER — OFFICE VISIT (OUTPATIENT)
Dept: FAMILY MEDICINE CLINIC | Age: 59
End: 2019-04-29
Payer: COMMERCIAL

## 2019-04-29 VITALS
BODY MASS INDEX: 31.82 KG/M2 | TEMPERATURE: 98.3 F | OXYGEN SATURATION: 99 % | HEART RATE: 63 BPM | HEIGHT: 66 IN | SYSTOLIC BLOOD PRESSURE: 130 MMHG | WEIGHT: 198 LBS | DIASTOLIC BLOOD PRESSURE: 68 MMHG

## 2019-04-29 DIAGNOSIS — J30.1 NON-SEASONAL ALLERGIC RHINITIS DUE TO POLLEN: ICD-10-CM

## 2019-04-29 DIAGNOSIS — Z77.011 LEAD EXPOSURE: ICD-10-CM

## 2019-04-29 DIAGNOSIS — Z00.00 ANNUAL PHYSICAL EXAM: Primary | ICD-10-CM

## 2019-04-29 DIAGNOSIS — E27.8 ADRENAL NODULE (HCC): ICD-10-CM

## 2019-04-29 PROCEDURE — 99396 PREV VISIT EST AGE 40-64: CPT | Performed by: INTERNAL MEDICINE

## 2019-04-29 ASSESSMENT — PATIENT HEALTH QUESTIONNAIRE - PHQ9
1. LITTLE INTEREST OR PLEASURE IN DOING THINGS: 0
SUM OF ALL RESPONSES TO PHQ9 QUESTIONS 1 & 2: 0
SUM OF ALL RESPONSES TO PHQ QUESTIONS 1-9: 0
SUM OF ALL RESPONSES TO PHQ QUESTIONS 1-9: 0
2. FEELING DOWN, DEPRESSED OR HOPELESS: 0

## 2019-04-29 NOTE — PROGRESS NOTES
History and Physical      Jerman Novak  YOB: 1960    Date of Service:  4/29/2019    Chief Complaint:   Jerman Novak is a 60 y.o. female who presents for complete physical examination. HPI: New patient visit, Annual exam, HCM update and follow up chronic problems. Diagnosis Orders   1. Annual physical exam     2. Non-seasonal allergic rhinitis due to pollen     3. Lead exposure  LEAD, BLOOD   4.  Adrenal nodule (HCC)  ACTH    ALDOSTERONE    COMPREHENSIVE METABOLIC PANEL    CBC    CORTISOL AM    CATECHOLAMINES, FRACTIONATED, PLASMA         Wt Readings from Last 3 Encounters:   04/29/19 198 lb (89.8 kg)   04/09/19 200 lb (90.7 kg)   03/08/19 206 lb 12.8 oz (93.8 kg)     BP Readings from Last 3 Encounters:   04/29/19 130/68   04/09/19 (!) 163/97   04/09/19 (!) 151/77       Patient Active Problem List   Diagnosis    Narcolepsy    Obesity    PUD (peptic ulcer disease)       Preventive Care:  Health Maintenance   Topic Date Due    HIV screen  12/28/1974    Shingles Vaccine (2 of 3) 08/10/2016    Cervical cancer screen  12/11/2016    Breast cancer screen  04/26/2021    Lipid screen  02/07/2022    DTaP/Tdap/Td vaccine (2 - Td) 06/11/2024    Colon cancer screen colonoscopy  10/24/2028    Flu vaccine  Completed    Hepatitis C screen  Completed    Pneumococcal 0-64 years Vaccine  Aged Out      Hx abnormal PAP: no  Sexual activity: single partner, contraception - none   Self-breast exams: yes  Previous DEXA scan: no  Exercise: no regular exercise  Seatbelt use: yes  Lipid panel:   Lab Results   Component Value Date    CHOL 164 02/07/2017    TRIG 45 02/07/2017    HDL 70 (H) 02/07/2017    LDLCALC 85 02/07/2017        Advance Directive: N, Not Received    Immunization History   Administered Date(s) Administered    Influenza Virus Vaccine 08/21/2014, 09/16/2017    Influenza, Lisbeth Gamma, 3 Years and older, IM (Fluzone 3 yrs and older or Afluria 5 yrs and older) 11/01/2016    Influenza, Lisbeth Gamma, 3 file   Relationships    Social connections:     Talks on phone: Not on file     Gets together: Not on file     Attends Scientologist service: Not on file     Active member of club or organization: Not on file     Attends meetings of clubs or organizations: Not on file     Relationship status: Not on file    Intimate partner violence:     Fear of current or ex partner: Not on file     Emotionally abused: Not on file     Physically abused: Not on file     Forced sexual activity: Not on file   Other Topics Concern    Not on file   Social History Narrative    Not on file       Review of Systems:  A comprehensive review of systems was negative except for what was noted in the HPI. Physical Exam:   Vitals:    04/29/19 0951   BP: 130/68   Pulse: 63   Temp: 98.3 °F (36.8 °C)   TempSrc: Oral   SpO2: 99%   Weight: 198 lb (89.8 kg)   Height: 5' 5.75\" (1.67 m)     Body mass index is 32.2 kg/m². Constitutional: She is oriented to person, place, and time. She appears well-developed and well-nourished. No distress. HEENT:   Head: Normocephalic and atraumatic. Right Ear: Tympanic membrane, external ear and ear canal normal.   Left Ear: Tympanic membrane, external ear and ear canal normal.   Nose: Nose normal.   Mouth/Throat: Oropharynx is clear and moist, and mucous membranes are normal.  There is no cervical adenopathy. Eyes: Conjunctivae and extraocular motions are normal. Pupils are equal, round, and reactive to light. Neck: Neck supple. No JVD present. Carotid bruit is not present. No mass and no thyromegaly present. Cardiovascular: Normal rate, regular rhythm, normal heart sounds and intact distal pulses. Exam reveals no gallop and no friction rub. No murmur heard. Pulmonary/Chest: Effort normal and breath sounds normal. No respiratory distress. She has no wheezes, rhonchi or rales. Abdominal: Soft, non-tender. Bowel sounds and aorta are normal. She exhibits no organomegaly, mass or bruit. Genitourinary:performed by gynecologist.  Breast exam:  performed by specialist.  Musculoskeletal: Normal range of motion, no synovitis. She exhibits no edema. Neurological: She is alert and oriented to person, place, and time. She has normal reflexes. No cranial nerve deficit. Coordination normal.   Skin: Skin is warm and dry. There is no rash or erythema. No suspicious lesions noted. Psychiatric: She has a normal mood and affect. Her speech is normal and behavior is normal. Judgment, cognition and memory are normal.     Assessment/Plan:    Nabeel Rogers was seen today for annual exam and other.     Diagnoses and all orders for this visit:    Annual physical exam    Non-seasonal allergic rhinitis due to pollen    Lead exposure  -     LEAD, BLOOD    Adrenal nodule (HCC)  -     ACTH  -     ALDOSTERONE  -     COMPREHENSIVE METABOLIC PANEL  -     CBC  -     CORTISOL AM  -     CATECHOLAMINES, FRACTIONATED, PLASMA    new, seen on gastric ultrasound, will check hormone levels

## 2019-05-01 DIAGNOSIS — E27.9 DISORDER OF ADRENAL GLAND (HCC): Primary | ICD-10-CM

## 2019-05-02 LAB
ALBUMIN SERPL-MCNC: 4.2 G/DL
ALP BLD-CCNC: 51 U/L
ALT SERPL-CCNC: 15 U/L
ANION GAP SERPL CALCULATED.3IONS-SCNC: 1.8 MMOL/L
AST SERPL-CCNC: 31 U/L
BASOPHILS ABSOLUTE: 0 /ΜL
BASOPHILS RELATIVE PERCENT: 0 %
BILIRUB SERPL-MCNC: 0.2 MG/DL (ref 0.1–1.4)
BUN BLDV-MCNC: 7 MG/DL
CALCIUM SERPL-MCNC: 9.1 MG/DL
CHLORIDE BLD-SCNC: 105 MMOL/L
CO2: 24 MMOL/L
CREAT SERPL-MCNC: 0.75 MG/DL
EOSINOPHILS ABSOLUTE: 0.1 /ΜL
EOSINOPHILS RELATIVE PERCENT: 4 %
GFR CALCULATED: 101
GLUCOSE BLD-MCNC: 89 MG/DL
HCT VFR BLD CALC: 37.2 % (ref 36–46)
HEMOGLOBIN: 12.7 G/DL (ref 12–16)
LEAD BLOOD: 1
LYMPHOCYTES ABSOLUTE: 1.6 /ΜL
LYMPHOCYTES RELATIVE PERCENT: 44 %
MCH RBC QN AUTO: NORMAL PG
MCHC RBC AUTO-ENTMCNC: NORMAL G/DL
MCV RBC AUTO: NORMAL FL
MONOCYTES ABSOLUTE: 0.3 /ΜL
MONOCYTES RELATIVE PERCENT: 9 %
NEUTROPHILS ABSOLUTE: 1.5 /ΜL
NEUTROPHILS RELATIVE PERCENT: 43 %
PLATELET # BLD: 193 K/ΜL
PMV BLD AUTO: NORMAL FL
POTASSIUM SERPL-SCNC: 5.2 MMOL/L
RBC # BLD: 4.25 10^6/ΜL
SODIUM BLD-SCNC: 143 MMOL/L
TOTAL PROTEIN: 6.5
WBC # BLD: 3.5 10^3/ML

## 2019-05-06 DIAGNOSIS — D72.819 LEUKOPENIA, UNSPECIFIED TYPE: Primary | ICD-10-CM

## 2019-05-06 DIAGNOSIS — J01.90 ACUTE BACTERIAL SINUSITIS: ICD-10-CM

## 2019-05-06 DIAGNOSIS — B96.89 ACUTE BACTERIAL SINUSITIS: ICD-10-CM

## 2019-05-06 DIAGNOSIS — R79.89 LOW SERUM CORTISOL LEVEL: ICD-10-CM

## 2019-05-06 DIAGNOSIS — E27.9 DISORDER OF ADRENAL GLAND (HCC): ICD-10-CM

## 2019-05-07 ENCOUNTER — TELEPHONE (OUTPATIENT)
Dept: ENDOCRINOLOGY | Age: 59
End: 2019-05-07

## 2019-05-08 NOTE — TELEPHONE ENCOUNTER
Please schedule the patient with Dr. Catherine Bolton in [facility] if she has any openings in June for adrenal nodule and low normal cortisol.

## 2019-05-10 NOTE — TELEPHONE ENCOUNTER
Called pt l/m to call us back to schedule a NP appt with Dr. Ashley Rocha at the [facility] office in June.

## 2019-06-18 PROBLEM — R79.89 LOW SERUM CORTISOL LEVEL: Status: ACTIVE | Noted: 2019-06-18

## 2019-06-19 ENCOUNTER — OFFICE VISIT (OUTPATIENT)
Dept: ENDOCRINOLOGY | Age: 59
End: 2019-06-19
Payer: COMMERCIAL

## 2019-06-19 VITALS
WEIGHT: 199 LBS | SYSTOLIC BLOOD PRESSURE: 130 MMHG | HEART RATE: 60 BPM | DIASTOLIC BLOOD PRESSURE: 84 MMHG | BODY MASS INDEX: 31.98 KG/M2 | HEIGHT: 66 IN | OXYGEN SATURATION: 99 %

## 2019-06-19 DIAGNOSIS — R19.7 DIARRHEA, UNSPECIFIED TYPE: ICD-10-CM

## 2019-06-19 DIAGNOSIS — E04.9 GOITER: ICD-10-CM

## 2019-06-19 DIAGNOSIS — D84.9 IMMUNODEFICIENCY (HCC): ICD-10-CM

## 2019-06-19 DIAGNOSIS — E27.8 ADRENAL NODULE (HCC): Primary | ICD-10-CM

## 2019-06-19 DIAGNOSIS — E66.9 CLASS 1 OBESITY WITH BODY MASS INDEX (BMI) OF 32.0 TO 32.9 IN ADULT, UNSPECIFIED OBESITY TYPE, UNSPECIFIED WHETHER SERIOUS COMORBIDITY PRESENT: ICD-10-CM

## 2019-06-19 DIAGNOSIS — R79.89 LOW SERUM CORTISOL LEVEL: ICD-10-CM

## 2019-06-19 PROCEDURE — 99204 OFFICE O/P NEW MOD 45 MIN: CPT | Performed by: INTERNAL MEDICINE

## 2019-06-19 NOTE — PROGRESS NOTES
SUBJECTIVE:  Sally Aguilar is a 60 y.o. female who is here for a low cortisol level. 1. Low serum cortisol level ([address]    This started in 2019. Patient was diagnosed with low cortisol. The problem has been gradually worsening. Patient started medication in N/A. Currently patient is on: N/A. Misses  N/A doses a month. Current complaints: fatigue, difficulty losing weight, diarrhea. 2. Goiter    History of obstructive symptoms: difficulty swallowing No, changes in voice/hoarseness No.  History of radiation to patient's neck: No  Resent iodine exposure: No  Family history includes hypothyroidism. Family history of thyroid cancer: No    3. Immunodeficiency ([address]  No frequent illnesses or infections. 4. Diarrhea, unspecified type  Has 3 bowel movements a day. Eats high fiber diet. No nausea, vomiting, abdominal pain. No flushing    5. Adrenal nodule (HCC)  No HTN. No headaches. Endoscopic US- 13.9 mm left adrenal nodule    6. Class 1 obesity with body mass index (BMI) of 32.0 to 32.9 in adult, unspecified obesity type, unspecified whether serious comorbidity present  Eats very healthy. Walks, exercises.       Past Medical History:   Diagnosis Date    Narcolepsy     Non-seasonal allergic rhinitis due to pollen 4/29/2019    Obesity 6/11/2014     Patient Active Problem List    Diagnosis Date Noted    Immunodeficiency [facility]) 06/19/2019    Diarrhea 06/19/2019    Goiter 06/19/2019    Low serum cortisol level (HCC) 06/18/2019    Non-seasonal allergic rhinitis due to pollen 04/29/2019    Adrenal nodule ([address] 04/29/2019    PUD (peptic ulcer disease) 10/28/2018    Class 1 obesity with body mass index (BMI) of 32.0 to 32.9 in adult 06/11/2014    Narcolepsy      Past Surgical History:   Procedure Laterality Date    COLONOSCOPY  1/24/2013    COLONOSCOPY N/A 10/24/2018    COLONOSCOPY with random colon biopsies for colitis performed by Letty Sapp MD at [facility] [address] Right 1998    ACL replacement    UPPER GASTROINTESTINAL ENDOSCOPY N/A 10/24/2018    EGD BIOPSY small bowel for sprue performed by Shannon Hoff MD at [address] N/A 4/9/2019    ESOPHAGOGASTRODUODENOSCOPY WITH ENDOSCOPIC ULTRASOUND OF ESOPHAGUS performed by Majo Avendaño MD at [facility] ENDOSCOPY     Family History   Problem Relation Age of Onset    Thyroid Disease Mother     Heart Disease Father     Cancer Father         testicular    Other Sister         Seizure Disorder    Heart Defect Sister      Social History     Socioeconomic History    Marital status:      Spouse name: None    Number of children: None    Years of education: None    Highest education level: None   Occupational History    None   Social Needs    Financial resource strain: None    Food insecurity:     Worry: None     Inability: None    Transportation needs:     Medical: None     Non-medical: None   Tobacco Use    Smoking status: Former Smoker    Smokeless tobacco: Never Used   Substance and Sexual Activity    Alcohol use:  Yes     Alcohol/week: 0.0 oz     Comment: rarely    Drug use: No    Sexual activity: Yes     Partners: Male   Lifestyle    Physical activity:     Days per week: None     Minutes per session: None    Stress: None   Relationships    Social connections:     Talks on phone: None     Gets together: None     Attends [employer] service: None     Active member of club or organization: None     Attends meetings of clubs or organizations: None     Relationship status: None    Intimate partner violence:     Fear of current or ex partner: None     Emotionally abused: None     Physically abused: None     Forced sexual activity: None   Other Topics Concern    None   Social History Narrative    None     Current Outpatient Medications   Medication Sig Dispense Refill    Cyanocobalamin (VITAMIN B 12 PO) Take by mouth      vitamin D (CHOLECALCIFEROL) 1000 UNIT TABS tablet Take 1,000 Units by mouth 2 times daily.  b complex vitamins capsule Take 1 capsule by mouth daily. No current facility-administered medications for this visit.       No Known Allergies  Family Status   Relation Name Status    Mother  Alive    Father      Sister  Alive       Review of Systems:  Constitutional: has fatigue, no fever, no recent weight gain, no recent weight loss, no changes in appetite  Eyes: no eye pain, has change in vision, no eye redness, no eye irritation, no double vision  Ears, nose, throat: no nasal congestion, no sore throat, no earache, no decrease in hearing, no hoarseness, no dry mouth, no sinus problems, no difficulty swallowing, no neck lumps, no dental problems, no mouth sores, no ringing in ears  Pulmonary: no shortness of breath, no wheezing, no dyspnea on exertion, no cough  Cardiovascular: no chest pain, has lower extremity edema, no orthopnea, no intermittent leg claudication, no palpitations  Gastrointestinal: no abdominal pain, no nausea, no vomiting, has diarrhea, no constipation, no dysphagia, no heartburn, no bloating  Genitourinary: no dysuria, no urinary incontinence, no urinary hesitancy, no urinary frequency, no feelings of urinary urgency, no nocturia  Musculoskeletal: no joint swelling, no joint stiffness, has joint pain, no muscle cramps, no muscle pain, no bone pain  Integument/Breast: no hair loss, no skin rashes, no skin lesions, no itching, no dry skin  Neurological: no numbness, no tingling, no weakness, no confusion, no headaches, no dizziness, no fainting, no tremors, no decrease in memory, no balance problems  Psychiatric: has anxiety, no depression, no insomnia  Hematologic/Lymphatic: no tendency for easy bleeding, no swollen lymph nodes, has tendency for easy bruising  Immunology: no seasonal allergies, no frequent infections, no frequent illnesses  Endocrine: no temperature intolerance    /84 (Site: Left Upper Arm, Position: Sitting, GLUCOSE 86 06/12/2014    CALCIUM 9.1 04/29/2019    LABALBU 4.2 04/29/2019    BILITOT 0.2 04/29/2019    ALKPHOS 51 04/29/2019    AST 31 04/29/2019    ALT 15 04/29/2019    LABGLOM 101 04/29/2019    LABGLOM >60 02/07/2017    GFRAA >60 02/07/2017     Lab Results   Component Value Date    TSH 2.530 06/12/2014     No results found for: LABA1C  No results found for: EAG  Lab Results   Component Value Date    CHOL 164 02/07/2017     Lab Results   Component Value Date    TRIG 45 02/07/2017     Lab Results   Component Value Date    HDL 70 02/07/2017     Lab Results   Component Value Date    LDLCALC 85 02/07/2017     Lab Results   Component Value Date    LABVLDL 9 02/07/2017    VLDL 7 06/12/2014     No results found for: CHOLHDLRATIO  No results found for: LABMICR, [id number]  No results found for: VITD25     ASSESSMENT/PLAN:  1. Low serum cortisol level ([facility]  May need Cortrosyn stim test  - SALIVARY CORTISOL; Future  - SALIVARY CORTISOL; Future  - SALIVARY CORTISOL; Future  - ACTH; Future  - Cortisol AM, Total; Future  - Metanephrines Plasma Free; Future  - Cortisol, Urine, Free; Future  - Metanephrines, urine, 24 hour; Future  - Catecholamines Free Urine; Future  - 5-Hydroxyindoleacetic acid (HIAA) urine; Future  - Creatinine Clearance, Urine, 24 HR; Future  - Creatinine Serum for Clearance; Future    2. Immunodeficiency ([facility]  - Lion Fry MD, Allergy & Immunology, [facility]    3. Diarrhea, unspecified type    - Cortisol, Urine, Free; Future  - Metanephrines, urine, 24 hour; Future  - Catecholamines Free Urine; Future  - 5-Hydroxyindoleacetic acid (HIAA) urine; Future  - Creatinine Clearance, Urine, 24 HR; Future  - Creatinine Serum for Clearance; Future  - VASOACTIVE INTESTINAL PEPTIDE ([facility]); Future  - GASTRIN; Future    4. Adrenal nodule (HCC)  CT of adrenals  - SALIVARY CORTISOL; Future  - SALIVARY CORTISOL; Future  - SALIVARY CORTISOL; Future  - ACTH;  Future  - Cortisol AM, Total; Future  -

## 2019-06-22 DIAGNOSIS — R19.7 DIARRHEA, UNSPECIFIED TYPE: ICD-10-CM

## 2019-06-22 DIAGNOSIS — R79.89 LOW SERUM CORTISOL LEVEL: ICD-10-CM

## 2019-06-22 DIAGNOSIS — E27.8 ADRENAL NODULE (HCC): ICD-10-CM

## 2019-06-22 DIAGNOSIS — E04.9 GOITER: ICD-10-CM

## 2019-06-22 LAB
ANTI-THYROGLOB ABS: 11 IU/ML
CORTISOL - AM: 10.4 UG/DL (ref 4.3–22.4)
CREAT SERPL-MCNC: 0.6 MG/DL (ref 0.6–1.2)
GFR AFRICAN AMERICAN: >60
GFR NON-AFRICAN AMERICAN: >60
T3 FREE: 3.2 PG/ML (ref 2.3–4.2)
T4 FREE: 1.2 NG/DL (ref 0.9–1.8)
THYROID PEROXIDASE (TPO) ABS: 18 IU/ML
TSH SERPL DL<=0.05 MIU/L-ACNC: 3.42 UIU/ML (ref 0.27–4.2)

## 2019-06-24 LAB — GASTRIN: 18 PG/ML (ref 0–100)

## 2019-06-25 LAB — ADRENOCORTICOTROPIC HORMONE: 18 PG/ML (ref 6–58)

## 2019-06-26 ENCOUNTER — APPOINTMENT (OUTPATIENT)
Dept: CT IMAGING | Age: 59
End: 2019-06-26
Payer: COMMERCIAL

## 2019-06-26 ENCOUNTER — HOSPITAL ENCOUNTER (OUTPATIENT)
Dept: ULTRASOUND IMAGING | Age: 59
Discharge: HOME | End: 2019-06-26
Payer: COMMERCIAL

## 2019-06-26 ENCOUNTER — TELEPHONE (OUTPATIENT)
Dept: ENDOCRINOLOGY | Age: 59
End: 2019-06-26

## 2019-06-26 DIAGNOSIS — E04.9 GOITER: ICD-10-CM

## 2019-06-26 LAB
CORTISOL SALIVARY: 0.02 UG/DL
CORTISOL SALIVARY: 0.03 UG/DL
CORTISOL SALIVARY: 0.05 UG/DL
METANEPH/PLASMA INTERP: NORMAL
METANEPHRINE FREE PLASMA: 0.11 NMOL/L (ref 0–0.49)
NORMETANEPHRINE FREE PLASMA: 0.73 NMOL/L (ref 0–0.89)

## 2019-06-26 PROCEDURE — 76536 US EXAM OF HEAD AND NECK: CPT

## 2019-06-28 ENCOUNTER — TELEPHONE (OUTPATIENT)
Dept: ENDOCRINOLOGY | Age: 59
End: 2019-06-28

## 2019-06-28 DIAGNOSIS — E04.9 GOITER: ICD-10-CM

## 2019-06-28 DIAGNOSIS — R19.7 DIARRHEA, UNSPECIFIED TYPE: ICD-10-CM

## 2019-06-28 DIAGNOSIS — E27.8 ADRENAL NODULE (HCC): ICD-10-CM

## 2019-06-28 DIAGNOSIS — R79.89 LOW SERUM CORTISOL LEVEL: ICD-10-CM

## 2019-06-28 PROBLEM — E04.2 MULTINODULAR GOITER: Status: ACTIVE | Noted: 2019-06-19

## 2019-06-28 LAB
24HR URINE VOLUME (ML): 4800 ML
CREAT SERPL-MCNC: 0.7 MG/DL (ref 0.6–1.2)
CREATININE 24 HOUR URINE: 1.3 G/24HR (ref 0.6–1.5)
CREATININE CLEARANCE: 115 ML/MIN (ref 88–128)
Lab: 24 HR

## 2019-06-29 NOTE — TELEPHONE ENCOUNTER
Please inform patient that thyroid ultrasound showed 2 nodules. One is slightly larger, 1.2 cm. Per radiology, it does not meet criteria to for biopsy. I will discuss during appointment.

## 2019-07-01 LAB
CATE U INT: NORMAL
DOPAMINE (G CRT): 215 UG/G CRT (ref 0–250)
DOPAMINE 24 HOUR URINE: 278 UG/D (ref 77–324)
DOPAMINE, (UG/L): 58 UG/L
EPINEPHRINE (G CRT): <4 UG/G CRT (ref 0–20)
EPINEPHRINE 24 HOUR URINE: <5 UG/D (ref 1–7)
EPINEPHRINE, (UG/L): <1 UG/L
NOREPINEPH (G CRT): 33 UG/G CRT (ref 0–45)
NOREPINEPHRINE 24 HOUR URINE: 43 UG/D (ref 16–71)
NOREPINEPHRINE, (UG/L): 9 UG/L

## 2019-07-02 LAB
5 HIAA URINE (PER GM CREAT): 4 MG/GCR (ref 0–14)
5-HIAA 24 HOUR URINE: 5 MG/D (ref 0–15)
5-HIAA INTERPRETATION: NORMAL
5-HIAA URINE: 1 MG/L
CORTISOL (UR), FREE: 27.3 UG/D
CORTISOL URINE, FREE (/G CRT): 21.07 UG/G CRT
CORTISOL,F,UG/L,U: 5.69 UG/L
CREATININE 24 HOUR URINE: 1296 MG/D (ref 500–1400)
CREATININE URINE: 27 MG/DL
HOURS COLLECTED: 24 HR
INTERPRETATION: NORMAL
METANEPHRINE INTREP URINE: NORMAL
METANEPHRINE UG/G CRE: 63 UG/G CRT (ref 0–300)
METANEPHRINE, UR-PER VOL: 17 UG/L
METANEPHRINES URINE: 82 UG/D (ref 39–143)
NORMETANEPHRINE 24 HOUR URINE: 298 UG/D (ref 109–393)
NORMETANEPHRINE, (G/CRT): 230 UG/G CRT (ref 0–400)
NORMETANEPHRINES, NMOL/L: 62 UG/L
URINE TOTAL VOLUME: 4800 ML

## 2019-07-15 ENCOUNTER — TELEPHONE (OUTPATIENT)
Dept: ENDOCRINOLOGY | Age: 59
End: 2019-07-15

## 2019-07-15 DIAGNOSIS — E27.8 ADRENAL NODULE (HCC): Primary | ICD-10-CM

## 2019-07-15 NOTE — TELEPHONE ENCOUNTER
Magalie called to see if they could get an order for the CT scan Adrenal W WO Contrast. They klast order was discontinued for some reason.

## 2019-07-16 ENCOUNTER — HOSPITAL ENCOUNTER (OUTPATIENT)
Dept: CT IMAGING | Age: 59
Discharge: HOME | End: 2019-07-16
Payer: COMMERCIAL

## 2019-07-16 DIAGNOSIS — E27.8 ADRENAL NODULE (HCC): ICD-10-CM

## 2019-07-16 PROCEDURE — 6360000004 HC RX CONTRAST MEDICATION: Performed by: INTERNAL MEDICINE

## 2019-07-16 PROCEDURE — 74170 CT ABD WO CNTRST FLWD CNTRST: CPT

## 2019-07-16 RX ADMIN — IOPAMIDOL 80 ML: 755 INJECTION, SOLUTION INTRAVENOUS at 13:13

## 2019-07-24 ENCOUNTER — NURSE TRIAGE (OUTPATIENT)
Dept: OTHER | Facility: CLINIC | Age: 59
End: 2019-07-24

## 2019-07-24 ENCOUNTER — OFFICE VISIT (OUTPATIENT)
Dept: FAMILY MEDICINE CLINIC | Age: 59
End: 2019-07-24
Payer: COMMERCIAL

## 2019-07-24 VITALS
SYSTOLIC BLOOD PRESSURE: 122 MMHG | DIASTOLIC BLOOD PRESSURE: 68 MMHG | BODY MASS INDEX: 31.98 KG/M2 | TEMPERATURE: 98.4 F | OXYGEN SATURATION: 99 % | HEIGHT: 66 IN | HEART RATE: 70 BPM | WEIGHT: 199 LBS

## 2019-07-24 DIAGNOSIS — B37.0 THRUSH: Primary | ICD-10-CM

## 2019-07-24 PROCEDURE — 99212 OFFICE O/P EST SF 10 MIN: CPT | Performed by: FAMILY MEDICINE

## 2019-07-24 NOTE — PROGRESS NOTES
Chief Complaint   Patient presents with    Rash     New onset 5 days ago. White spots under tongue lower mouth. Tried benadryl, mouthwash, no help. Then water pick and helped some but can still feel  Felt rough like sand paper when tongue comes in contact with lesions  No prior hx of similar. Blood sugar wnl in May. No recent abx/steroids         /68 (Site: Left Upper Arm, Position: Sitting, Cuff Size: Medium Adult)   Pulse 70   Temp 98.4 °F (36.9 °C) (Oral)   Ht 5' 6\" (1.676 m)   Wt 199 lb (90.3 kg)   SpO2 99%   BMI 32.12 kg/m²     A+Ox4, NAD, WD/WN  Conj clear, no icterus  OP 4-5 punctate white plaque or exudative lesions under tongue floor of mouth. ENT otherwise wnl. Neck supple, no LAD      Assessment/plan:     Madonna Robison was seen today for rash. Diagnoses and all orders for this visit:    Alan Buck  Rx per orders. Pt educ. Defer further workup unless recurrent or persists. Pt advised to call or follow up with any significant changes or no improvement over the expected time period. -     nystatin (MYCOSTATIN) [id number] UNIT/ML suspension;  Take 5 mLs by mouth 4 times daily

## 2019-07-26 LAB
HPV COMMENT: NORMAL
HPV TYPE 16: NOT DETECTED
HPV TYPE 18: NOT DETECTED
HPVOH (OTHER TYPES): NOT DETECTED

## 2019-08-09 ENCOUNTER — TELEPHONE (OUTPATIENT)
Dept: ENDOCRINOLOGY | Age: 59
End: 2019-08-09

## 2019-08-09 DIAGNOSIS — E27.8 ADRENAL NODULE (HCC): ICD-10-CM

## 2019-08-09 DIAGNOSIS — R79.89 LOW SERUM CORTISOL LEVEL: ICD-10-CM

## 2019-08-09 DIAGNOSIS — E04.2 MULTINODULAR GOITER: Primary | ICD-10-CM

## 2019-08-09 NOTE — TELEPHONE ENCOUNTER
Pt does not have lab order for her appointment on Tuesday 8/13/19. Does she needs to have labs done?

## 2019-08-14 ENCOUNTER — OFFICE VISIT (OUTPATIENT)
Dept: ENDOCRINOLOGY | Age: 59
End: 2019-08-14
Payer: COMMERCIAL

## 2019-08-14 VITALS
BODY MASS INDEX: 31.98 KG/M2 | OXYGEN SATURATION: 100 % | HEART RATE: 65 BPM | DIASTOLIC BLOOD PRESSURE: 85 MMHG | WEIGHT: 199 LBS | HEIGHT: 66 IN | SYSTOLIC BLOOD PRESSURE: 137 MMHG

## 2019-08-14 DIAGNOSIS — D84.9 IMMUNODEFICIENCY (HCC): ICD-10-CM

## 2019-08-14 DIAGNOSIS — E27.8 ADRENAL NODULE (HCC): ICD-10-CM

## 2019-08-14 DIAGNOSIS — E66.9 CLASS 1 OBESITY WITH BODY MASS INDEX (BMI) OF 32.0 TO 32.9 IN ADULT, UNSPECIFIED OBESITY TYPE, UNSPECIFIED WHETHER SERIOUS COMORBIDITY PRESENT: ICD-10-CM

## 2019-08-14 DIAGNOSIS — R19.7 DIARRHEA, UNSPECIFIED TYPE: ICD-10-CM

## 2019-08-14 DIAGNOSIS — E04.2 MULTINODULAR GOITER: ICD-10-CM

## 2019-08-14 DIAGNOSIS — R79.89 LOW SERUM CORTISOL LEVEL: Primary | ICD-10-CM

## 2019-08-14 PROCEDURE — 99214 OFFICE O/P EST MOD 30 MIN: CPT | Performed by: INTERNAL MEDICINE

## 2019-08-14 NOTE — PROGRESS NOTES
SUBJECTIVE:  Bright Natarajan is a 60 y.o. female who is here for a low cortisol level. 1. Low serum cortisol level    This started in 2019. Patient was diagnosed with low cortisol. The problem has been gradually worsening. Patient started medication in N/A. Currently patient is on: N/A. Misses  N/A doses a month. Current complaints: fatigue, difficulty losing weight, diarrhea. No weight loss. 2.  Multinodular goiter    History of obstructive symptoms: difficulty swallowing No, changes in voice/hoarseness No.  History of radiation to patient's neck: No  Resent iodine exposure: No  Family history includes hypothyroidism. Family history of thyroid cancer: No    3. Immunodeficiency   No frequent illnesses or infections. Saw immunologist.    4. Diarrhea, unspecified type  Has 3 bowel movements a day. Eats high fiber diet. No nausea, vomiting, abdominal pain. No flushing    5. Adrenal nodule   No HTN. No headaches. Endoscopic US- 13.9 mm left adrenal nodule  7/16/2019 CT Nondescript tiny nodular focus contiguous with the lateral limb of the left adrenal gland, probably an adenoma. 6. Class 1 obesity with body mass index (BMI) of 32.0 to 32.9 in adult, unspecified obesity type, unspecified whether serious comorbidity present  Eats very healthy. Walks, exercises. THYROID ULTRASOUND   History : Goiter       COMMENTS :    Thyroid size : Right lobe 4.1 x 1.5 x 0.9 cm                                     Left lobe 4.1 x 1.2 x 1.4 cm   Echotexture : normal   Nodules : Posteriorly within left hepatic lobe, there is a complex 1.2 x 0.8 x 0.6 cm prominently solid nodule. In addition, there is a 0.5 x 0.5 x 0.4 cm isoechoic nodule within the left hepatic lobe.                 Impression   IMPRESSION :       Thyroid nodules of the left lobe, with the largest nodule measuring up to 1.2 cm.  Continued imaging follow-up is recommended, as this does not meet sonographic size criteria for fine-needle aspiration index is 32.12 kg/m².     OBJECTIVE:  Constitutional: no acute distress, well appearing and well nourished  Psychiatric: oriented to person, place and time, judgement and insight and normal, recent and remote memory and intact and mood and affect are normal  Skin: skin and subcutaneous tissue is normal without mass, normal turgor  Head and Face: examination of head and face revealed no abnormalities  Eyes: no lid or conjunctival swelling, erythema or discharge, pupils are normal, equal, round, reactive to light  Ears/Nose: external inspection of ears and nose revealed no abnormalities, hearing is grossly normal  Oropharynx/Mouth/Face: lips, tongue and gums are normal with no lesions, the voice quality was normal  Neck: neck is supple and symmetric, with midline trachea and no masses, thyroid is pebbly on palpation  Lymphatics: normal cervical lymph nodes, normal supraclavicular nodes  Pulmonary: no increased work of breathing or signs of respiratory distress, lungs are clear to auscultation  Cardiovascular: normal heart rate and rhythm, normal S1 and S2, no murmurs and pedal pulses and 2+ bilaterally, No edema  Abdomen: abdomen is soft, non-tender with no masses  Musculoskeletal: normal gait and station and exam of the digits and nails are normal  Neurological: normal coordination and normal general cortical function      Lab Review:    Lab Results   Component Value Date    WBC 3.5 04/29/2019    HGB 12.7 04/29/2019    HCT 37.2 04/29/2019    MCV 91.2 02/23/2018     04/29/2019     Lab Results   Component Value Date     04/29/2019    K 5.2 04/29/2019     04/29/2019    CO2 24 04/29/2019    BUN 7 04/29/2019    CREATININE 0.7 06/28/2019    GLUCOSE 89 04/29/2019    GLUCOSE 86 06/12/2014    CALCIUM 9.1 04/29/2019    LABALBU 4.2 04/29/2019    BILITOT 0.2 04/29/2019    ALKPHOS 51 04/29/2019    AST 31 04/29/2019    ALT 15 04/29/2019    LABGLOM >60 06/22/2019    GFRAA >60 06/22/2019     Lab Results   Component

## 2019-09-05 DIAGNOSIS — R19.7 DIARRHEA, UNSPECIFIED TYPE: ICD-10-CM

## 2019-09-05 DIAGNOSIS — E04.2 MULTINODULAR GOITER: ICD-10-CM

## 2019-09-05 DIAGNOSIS — R79.89 LOW SERUM CORTISOL LEVEL: ICD-10-CM

## 2019-09-05 DIAGNOSIS — E27.8 ADRENAL NODULE (HCC): ICD-10-CM

## 2019-09-05 LAB
24HR URINE VOLUME (ML): 3480 ML
A/G RATIO: 2.3 (ref 1.1–2.2)
ALBUMIN SERPL-MCNC: 4.6 G/DL (ref 3.4–5)
ALP BLD-CCNC: 51 U/L (ref 40–129)
ALT SERPL-CCNC: 17 U/L (ref 10–40)
ANION GAP SERPL CALCULATED.3IONS-SCNC: 14 MMOL/L (ref 3–16)
AST SERPL-CCNC: 19 U/L (ref 15–37)
BILIRUB SERPL-MCNC: 0.5 MG/DL (ref 0–1)
BUN BLDV-MCNC: 11 MG/DL (ref 7–20)
CALCIUM SERPL-MCNC: 9.3 MG/DL (ref 8.3–10.6)
CHLORIDE BLD-SCNC: 103 MMOL/L (ref 99–110)
CO2: 23 MMOL/L (ref 21–32)
CREAT SERPL-MCNC: 0.6 MG/DL (ref 0.6–1.1)
CREAT SERPL-MCNC: 0.6 MG/DL (ref 0.6–1.2)
CREATININE 24 HOUR URINE: 1.5 G/24HR (ref 0.6–1.5)
CREATININE CLEARANCE: 153 ML/MIN (ref 88–128)
GFR AFRICAN AMERICAN: >60
GFR NON-AFRICAN AMERICAN: >60
GLOBULIN: 2 G/DL
GLUCOSE BLD-MCNC: 92 MG/DL (ref 70–99)
Lab: 24 HR
POTASSIUM SERPL-SCNC: 3.9 MMOL/L (ref 3.5–5.1)
SODIUM BLD-SCNC: 140 MMOL/L (ref 136–145)
T3 FREE: 3.2 PG/ML (ref 2.3–4.2)
T4 FREE: 1.2 NG/DL (ref 0.9–1.8)
TOTAL PROTEIN: 6.6 G/DL (ref 6.4–8.2)
TSH SERPL DL<=0.05 MIU/L-ACNC: 3.2 UIU/ML (ref 0.27–4.2)

## 2019-09-09 LAB — VASOACTIVE INTESTINAL POLYPEPTIDE PLASMA: <13 PG/ML (ref 0–60)

## 2019-09-10 LAB
METANEPHRINE INTREP URINE: NORMAL
METANEPHRINE UG/G CRE: 55 UG/G CRT (ref 0–300)
METANEPHRINE, UR-PER VOL: 22 UG/L
METANEPHRINES URINE: 77 UG/D (ref 39–143)
NORMETANEPHRINE 24 HOUR URINE: 362 UG/D (ref 109–393)
NORMETANEPHRINE, (G/CRT): 260 UG/G CRT (ref 0–400)
NORMETANEPHRINES, NMOL/L: 104 UG/L

## 2019-09-12 LAB
CATE U INT: ABNORMAL
CORTISOL (UR), FREE: NORMAL UG/D
CORTISOL URINE, FREE (/G CRT): 22.6 UG/G CRT
CORTISOL,F,UG/L,U: 9.04 UG/L
CREATININE 24 HOUR URINE: ABNORMAL MG/D (ref 500–1400)
CREATININE URINE: 40 MG/DL
DOPAMINE (G CRT): 252 UG/G CRT (ref 0–250)
DOPAMINE 24 HOUR URINE: 351 UG/D (ref 77–324)
DOPAMINE, (UG/L): 101 UG/L
EPINEPHRINE (G CRT): 2 UG/G CRT (ref 0–20)
EPINEPHRINE 24 HOUR URINE: 3 UG/D (ref 1–14)
EPINEPHRINE, (UG/L): 1 UG/L
HOURS COLLECTED: ABNORMAL HR
INTERPRETATION: NORMAL
NOREPINEPH (G CRT): 38 UG/G CRT (ref 0–45)
NOREPINEPHRINE 24 HOUR URINE: 52 UG/D (ref 16–71)
NOREPINEPHRINE, (UG/L): 15 UG/L
URINE TOTAL VOLUME: ABNORMAL ML

## 2019-09-13 ENCOUNTER — TELEPHONE (OUTPATIENT)
Dept: ENDOCRINOLOGY | Age: 59
End: 2019-09-13

## 2019-09-13 LAB
CD16+CD56 %: 3 % (ref 7–31)
CD16+CD56 ABSOLUTE: 52 CELLS/MCL (ref 90–600)
CD19 ABSOLUTE: 313 CELLS/MCL (ref 100–500)
CD19 B CELL: 18 % (ref 6–19)
CD3 % TOTAL T CELLS: 78 % (ref 55–83)
CD3 ABSOLUTE: 1357 CELLS/MCL (ref 700–2100)
CD4 % HELPER T CELL: 52 % (ref 28–57)
CD4 T CELL ABSOLUTE: 905 CELLS/MCL (ref 300–1400)
CD4/CD8 RATIO: 2.5 (ref 1–3.6)
CD8 % SUPPRESSOR T CELL: 21 % (ref 10–39)
CD8 T CELL ABSOLUTE: 365 CELLS/MCL (ref 200–900)
REPORT STATUS: ABNORMAL

## 2019-09-16 ENCOUNTER — TELEPHONE (OUTPATIENT)
Dept: FAMILY MEDICINE CLINIC | Age: 59
End: 2019-09-16

## 2019-09-16 LAB
Lab: 12 %
Lab: 3 %
Lab: 3.3
Lab: 8 %
Lab: 8 %
REPORT STATUS: ABNORMAL

## 2019-09-20 LAB
CD19 ABSOLUTE: 261 CELLS/MCL (ref 100–500)
CD19 B CELL: 15 % (ref 6–19)
Lab: 0.01 % (ref 0–0.05)
Lab: 0.02 % (ref 0–0.43)
Lab: 1.3 % (ref 0.6–6)
Lab: 14 % (ref 7–14)
Lab: 15 % (ref 4–16)
Lab: 17 % (ref 11–50)
Lab: 2 % (ref 0.3–3)
Lab: 2.2 % (ref 0.8–5.8)
Lab: 2.4 % (ref 4.7–22.1)
Lab: 2.8 % (ref 0.3–2.9)
Lab: 27 % (ref 8–44)
Lab: 3.1 % (ref 1.5–6.2)
Lab: 35 % (ref 35–76)
Lab: 4 % (ref 19–53)
Lab: 40 % (ref 33–76)
Lab: 43 % (ref 40–80)
Lab: 5.4 % (ref 2.8–18.2)
Lab: 51 % (ref 8–34)
Lab: 57 % (ref 20–60)
Lab: 73 % (ref 56–92)
Lab: 83 % (ref 50–89)
Lab: 90 % (ref 28–69)
Lab: 94 % (ref 87–97)
REPORT STATUS: ABNORMAL

## 2019-09-23 LAB
Lab: 13.5
Lab: 16 %
Lab: 25 %
Lab: 48 %
Lab: 8 %
REPORT STATUS: ABNORMAL

## 2019-11-06 ENCOUNTER — TELEPHONE (OUTPATIENT)
Dept: FAMILY MEDICINE CLINIC | Age: 59
End: 2019-11-06

## 2020-01-16 ENCOUNTER — TELEPHONE (OUTPATIENT)
Dept: ENDOCRINOLOGY | Age: 60
End: 2020-01-16

## 2020-01-16 NOTE — TELEPHONE ENCOUNTER
PT requests her lab orders and Imaging orders to be mailed to her house. I didn't any labs in system except from 8/9/2019. I did however mail the imaging orders to the patient from 8/14/2019. She needs her labs though.

## 2020-01-24 DIAGNOSIS — E04.2 MULTINODULAR GOITER: ICD-10-CM

## 2020-01-24 DIAGNOSIS — R79.89 LOW SERUM CORTISOL LEVEL: ICD-10-CM

## 2020-01-24 LAB
A/G RATIO: 2.3 (ref 1.1–2.2)
ALBUMIN SERPL-MCNC: 4.6 G/DL (ref 3.4–5)
ALP BLD-CCNC: 47 U/L (ref 40–129)
ALT SERPL-CCNC: 37 U/L (ref 10–40)
ANION GAP SERPL CALCULATED.3IONS-SCNC: 15 MMOL/L (ref 3–16)
AST SERPL-CCNC: 51 U/L (ref 15–37)
BILIRUB SERPL-MCNC: 1.6 MG/DL (ref 0–1)
BUN BLDV-MCNC: 10 MG/DL (ref 7–20)
CALCIUM SERPL-MCNC: 9.4 MG/DL (ref 8.3–10.6)
CHLORIDE BLD-SCNC: 103 MMOL/L (ref 99–110)
CO2: 22 MMOL/L (ref 21–32)
CORTISOL - AM: 5.8 UG/DL (ref 4.3–22.4)
CREAT SERPL-MCNC: 0.6 MG/DL (ref 0.6–1.2)
GFR AFRICAN AMERICAN: >60
GFR NON-AFRICAN AMERICAN: >60
GLOBULIN: 2 G/DL
GLUCOSE BLD-MCNC: 89 MG/DL (ref 70–99)
POTASSIUM SERPL-SCNC: 4 MMOL/L (ref 3.5–5.1)
SODIUM BLD-SCNC: 140 MMOL/L (ref 136–145)
T3 FREE: 3 PG/ML (ref 2.3–4.2)
T4 FREE: 1.3 NG/DL (ref 0.9–1.8)
TOTAL PROTEIN: 6.6 G/DL (ref 6.4–8.2)
TSH SERPL DL<=0.05 MIU/L-ACNC: 3.49 UIU/ML (ref 0.27–4.2)

## 2020-01-28 LAB — ADRENOCORTICOTROPIC HORMONE: 15 PG/ML (ref 6–58)

## 2020-02-03 ENCOUNTER — HOSPITAL ENCOUNTER (OUTPATIENT)
Dept: CT IMAGING | Age: 60
Discharge: HOME | End: 2020-02-03
Payer: COMMERCIAL

## 2020-02-03 ENCOUNTER — HOSPITAL ENCOUNTER (OUTPATIENT)
Dept: ULTRASOUND IMAGING | Age: 60
Discharge: HOME | End: 2020-02-03
Payer: COMMERCIAL

## 2020-02-03 PROCEDURE — 76536 US EXAM OF HEAD AND NECK: CPT

## 2020-02-03 PROCEDURE — 74170 CT ABD WO CNTRST FLWD CNTRST: CPT

## 2020-02-03 PROCEDURE — 6360000004 HC RX CONTRAST MEDICATION: Performed by: INTERNAL MEDICINE

## 2020-02-03 RX ADMIN — IOPAMIDOL 80 ML: 755 INJECTION, SOLUTION INTRAVENOUS at 12:42

## 2020-02-26 ENCOUNTER — OFFICE VISIT (OUTPATIENT)
Dept: ENDOCRINOLOGY | Age: 60
End: 2020-02-26
Payer: COMMERCIAL

## 2020-02-26 VITALS
WEIGHT: 200.4 LBS | BODY MASS INDEX: 32.21 KG/M2 | SYSTOLIC BLOOD PRESSURE: 118 MMHG | HEIGHT: 66 IN | DIASTOLIC BLOOD PRESSURE: 76 MMHG | HEART RATE: 77 BPM | OXYGEN SATURATION: 98 %

## 2020-02-26 PROCEDURE — 99214 OFFICE O/P EST MOD 30 MIN: CPT | Performed by: INTERNAL MEDICINE

## 2020-02-26 NOTE — PROGRESS NOTES
SUBJECTIVE:  Desiree Lloyd is a 60 y.o. female who is here for a low cortisol level. 1. Low serum cortisol level    This started in 2019. Patient was diagnosed with low cortisol. The problem has been gradually worsening. Patient started medication in N/A. Currently patient is on: N/A. Misses  N/A doses a month. Current complaints: difficulty losing weight, loose stools. 2.  Multinodular goiter    History of obstructive symptoms: difficulty swallowing No, changes in voice/hoarseness No.  History of radiation to patient's neck: No  Resent iodine exposure: No  Family history includes hypothyroidism. Family history of thyroid cancer: No    3. Immunodeficiency   No frequent illnesses or infections. Saw immunologist.    4. Diarrhea, unspecified type  Up to 3 bowel movements a day. Eats high fiber diet. No nausea, vomiting, abdominal pain. No flushing    5. Adrenal nodule   No HTN. No headaches. Endoscopic US- 13.9 mm left adrenal nodule  7/16/2019 CT Nondescript tiny nodular focus contiguous with the lateral limb of the left adrenal gland, probably an adenoma. 6. Class 1 obesity with body mass index (BMI) of 32.0 to 32.9 in adult, unspecified obesity type, unspecified whether serious comorbidity present  Eats very healthy. Plant based. Walks, exercises. EXAM: CT abdomen and pelvis with and without contrast       HISTORY: Adrenal nodule (HCC)       TECHNIQUE: Contiguous axial images were obtained from the dome of the diaphragm through the ischial tuberosities after the administration of 80 mL of Isovue 370. Up-to-date CT equipment and radiation dose reduction techniques were employed. Noncontrast    images were obtained. Multiphasic postcontrast images were obtained for evaluation of an adrenal nodule.       COMPARISON: NONE.       FINDINGS:        Lower Lungs: No effusion or consolidation.       Adrenal glands: Noncontrast and postcontrast images demonstrate no discrete thyroid nodule.  Previous CT description is less conspicuous on the current exam. A discrete thyroid nodules not clearly identified. No suspicious retroperitoneal mass or    lymphadenopathy.       Upper abdomen: Normal liver. Spleen is mildly enlarged measuring 13 cm. Pancreas is unremarkable. Normal gallbladder. No biliary ductal dilation. Small focus of fatty infiltration along the falciform ligament.       Retroperitoneum: No hydronephrosis. Normal kidneys. No retroperitoneal lymphadenopathy.       Bowel and peritoneum: Nonobstructive bowel gas pattern. Normal appendix in the right lower quadrant partially visualized. No free air. No free fluid.       Bones: No suspicious osseous abnormality.           Impression       1. The previously described possible nodule in the left adrenal gland is not clearly visualized on the current exam. No adrenal nodule identified otherwise. No significant abdominal abnormality.                 EXAM: THYROID ULTRASOUND       INDICATION: Thyroid enlargement       COMPARISON: Prior thyroid ultrasound dated 6/26/2019.       FINDINGS:       RIGHT LOBE: The right thyroid lobe measures 5.9 x 0.9 x 1.5 cm. No evidence of nodules within the right thyroid lobe.       LEFT LOBE: The left thyroid lobe measures 5 x 1.6 x 1.2 cm.       Interval decrease in the size of the previously seen left thyroid lobe nodule within the inferior aspect of the gland. In the prior exam it measured 1.3 x 0.8 cm and in today's exam it measures 1 x 0.7 cm. The nodule appears hypoechoic to isoechoic to    the thyroid gland. No intrinsic vascularity. No intrinsic microcalcifications are seen.       Interval slight increase in the size of the nodule in the mid aspect of the left thyroid lobe. In prior exam it measured 0.5 cm and in today's exam it measures 0.8 x 0.7 cm. The nodule is hypoechoic and demonstrates no intrinsic vascularity of    microcalcification. No peripheral halo.       ISTHMUS: It measures 3 mm.       OTHER FINDINGS: None. palpation  Lymphatics: normal cervical lymph nodes, normal supraclavicular nodes  Pulmonary: no increased work of breathing or signs of respiratory distress, lungs are clear to auscultation  Cardiovascular: normal heart rate and rhythm, normal S1 and S2, no murmurs and pedal pulses and 2+ bilaterally, No edema  Abdomen: abdomen is soft, non-tender with no masses  Musculoskeletal: normal gait and station and exam of the digits and nails are normal  Neurological: normal coordination and normal general cortical function      Lab Review:    Lab Results   Component Value Date    WBC 5.07 09/13/2019    HGB 12.9 09/13/2019    HCT 39.0 09/13/2019    MCV 89.0 09/13/2019     09/13/2019     Lab Results   Component Value Date     01/24/2020    K 4.0 01/24/2020     01/24/2020    CO2 22 01/24/2020    BUN 10 01/24/2020    CREATININE 0.6 01/24/2020    GLUCOSE 89 01/24/2020    GLUCOSE 86 06/12/2014    CALCIUM 9.4 01/24/2020    PROT 6.6 01/24/2020    LABALBU 4.6 01/24/2020    BILITOT 1.6 01/24/2020    ALKPHOS 47 01/24/2020    AST 51 01/24/2020    ALT 37 01/24/2020    LABGLOM >60 01/24/2020    GFRAA >60 01/24/2020    AGRATIO 2.3 01/24/2020    GLOB 2.0 01/24/2020     Lab Results   Component Value Date    TSH 3.49 01/24/2020    FT3 3.0 01/24/2020     No results found for: LABA1C  No results found for: EAG  Lab Results   Component Value Date    CHOL 164 02/07/2017     Lab Results   Component Value Date    TRIG 45 02/07/2017     Lab Results   Component Value Date    HDL 70 02/07/2017     Lab Results   Component Value Date    LDLCALC 85 02/07/2017     Lab Results   Component Value Date    LABVLDL 9 02/07/2017    VLDL 7 06/12/2014     No results found for: CHOLHDLRATIO  No results found for: LABMICR, [id number]  No results found for: VITD25     ASSESSMENT/PLAN:  1. Low serum cortisol level     ACTH and cortisol normal.  - ACTH; Future  - Cortisol AM, Total; Future    2.  Immunodeficiency   - AFL - Ernie Tolbert MD, Allergy & Immunology, [facility]  Follows. 3. Diarrhea, unspecified type  Gastrin negative. - VASOACTIVE INTESTINAL PEPTIDE ([facility]) <13    4. Adrenal nodule   1. The previously described possible nodule in the left adrenal gland is not clearly visualized on the current exam. No adrenal nodule identified otherwise. No significant abdominal abnormality. Nondescript tiny nodular focus contiguous with the lateral limb of the left adrenal gland, probably an adenoma. CT of adrenals follow-up  Salivary cortisol, 24-hour urine cortisol, metanephrines, catecholamines, 5 HIAA negative. 5. Class 1 obesity with body mass index (BMI) of 32.0 to 32.9 in adult, unspecified obesity type, unspecified whether serious comorbidity present  Diet, exercise. 6.  Multinodular goiter  See PCP for follow up, see me as needed  Follow thyroid sonogram in 1 year, second nodule on the left love larger, still below 1 cm. TSH 3.49  Interval decrease in the size of the previously seen left thyroid lobe nodule within the inferior aspect of the gland. In the prior exam it measured 1.3 x 0.8 cm and in today's exam it measures 1 x 0.7 cm. The nodule appears hypoechoic to isoechoic to    the thyroid gland. No intrinsic vascularity. No intrinsic microcalcifications are seen.       Interval slight increase in the size of the nodule in the mid aspect of the left thyroid lobe. In prior exam it measured 0.5 cm and in today's exam it measures 0.8 x 0.7 cm. The nodule is hypoechoic and demonstrates no intrinsic vascularity of    microcalcification. No peripheral halo. - US Head Neck Soft Tissue Thyroid; Future  Largest nodule left thyroid lobe 1.2 cm. Radiology did not recommend a biopsy. Discussed with patient. Patient prefers to follow with ultrasound. She understands the risks.     Reviewed and/or ordered clinical lab results Yes  Reviewed and/or ordered radiology tests Yes   Reviewed and/or ordered other diagnostic tests

## 2020-03-22 ENCOUNTER — NURSE TRIAGE (OUTPATIENT)
Dept: OTHER | Facility: CLINIC | Age: 60
End: 2020-03-22

## 2020-03-22 ENCOUNTER — E-VISIT (OUTPATIENT)
Dept: FAMILY MEDICINE CLINIC | Age: 60
End: 2020-03-22
Payer: COMMERCIAL

## 2020-03-22 PROCEDURE — 98970 NQHP OL DIG ASSMT&MGMT 5-10: CPT | Performed by: PHYSICIAN ASSISTANT

## 2020-03-22 NOTE — TELEPHONE ENCOUNTER
Reason for Disposition   Age > 50 years    Protocols used: URINATION PAIN - FEMALE-ADULT-[place]    Pt is calling with c/o a possible UTI. Recommended that pt see PCP or visit urgent care today. PCP office is already closed. Provided pt with information about MyChart. Also provided pt with care advice.

## 2020-03-23 RX ORDER — NITROFURANTOIN 25; 75 MG/1; MG/1
100 CAPSULE ORAL 2 TIMES DAILY
Qty: 14 CAPSULE | Refills: 0 | Status: SHIPPED | OUTPATIENT
Start: 2020-03-23 | End: 2020-03-30

## 2020-03-23 RX ORDER — PHENAZOPYRIDINE HYDROCHLORIDE 100 MG/1
100 TABLET, FILM COATED ORAL 3 TIMES DAILY PRN
Qty: 12 TABLET | Refills: 0 | Status: SHIPPED | OUTPATIENT
Start: 2020-03-23 | End: 2020-12-18 | Stop reason: ALTCHOICE

## 2020-03-23 NOTE — PROGRESS NOTES
Based on questionnaire, symptoms consistent with UTI. Attempted to review past urine culture but no results in labs or CE. Will treat with macrobid and place orders for UA and urine culture. If symptoms do not improve, patient instructed to leave urine sample. 5-10 minutes were spent on the digital evaluation and management of this patient.
Yes

## 2020-06-17 ENCOUNTER — HOSPITAL ENCOUNTER (OUTPATIENT)
Dept: MAMMOGRAPHY | Age: 60
Discharge: HOME | End: 2020-06-17
Payer: COMMERCIAL

## 2020-06-17 PROCEDURE — 77063 BREAST TOMOSYNTHESIS BI: CPT

## 2020-11-10 ENCOUNTER — E-VISIT (OUTPATIENT)
Dept: FAMILY MEDICINE CLINIC | Age: 60
End: 2020-11-10
Payer: COMMERCIAL

## 2020-11-10 PROCEDURE — 99422 OL DIG E/M SVC 11-20 MIN: CPT | Performed by: FAMILY MEDICINE

## 2020-11-11 RX ORDER — SULFAMETHOXAZOLE AND TRIMETHOPRIM 800; 160 MG/1; MG/1
1 TABLET ORAL 2 TIMES DAILY
Qty: 10 TABLET | Refills: 0 | Status: SHIPPED | OUTPATIENT
Start: 2020-11-11 | End: 2020-11-16

## 2020-11-11 NOTE — PROGRESS NOTES
The above-named patient has requested evaluation and management services through an electronic visit by way of [employer] powered by EP[employer] and provided by [address] [place] [address]. The history of present illness provided by the patient as well as medications, allergies, past medical history have been reviewed in this electronic health record. Following evaluation and management recommendations have been made and communicated to the patient via [employer]: Thank you for submitting an Evisit. I'm sorry you aren't feeling well. It sounds like you most likely have a urinary tract infection. I will send in a prescription for Bactrim DS to your pharmacy. You should also try to drink more water. Taking a probiotic would also be helpful to prevent antibiotic-associated diarrhea. Contact your primary care physician's office if you do not see any improvement with the medication within the next 3 days. Please seek more urgent medical attention if you develop high fever, vomiting, or severe back/flank pain. 11-20 minutes were spent on the digital evaluation and management of this patient.

## 2020-12-18 ENCOUNTER — OFFICE VISIT (OUTPATIENT)
Dept: FAMILY MEDICINE CLINIC | Age: 60
End: 2020-12-18
Payer: COMMERCIAL

## 2020-12-18 VITALS
DIASTOLIC BLOOD PRESSURE: 70 MMHG | HEART RATE: 78 BPM | OXYGEN SATURATION: 98 % | SYSTOLIC BLOOD PRESSURE: 130 MMHG | BODY MASS INDEX: 31.8 KG/M2 | WEIGHT: 197 LBS

## 2020-12-18 LAB
BILIRUBIN, POC: ABNORMAL
BLOOD URINE, POC: ABNORMAL
CLARITY, POC: CLEAR
COLOR, POC: YELLOW
GLUCOSE URINE, POC: ABNORMAL
KETONES, POC: ABNORMAL
LEUKOCYTE EST, POC: ABNORMAL
NITRITE, POC: ABNORMAL
PH, POC: 7
PROTEIN, POC: ABNORMAL
SPECIFIC GRAVITY, POC: 1.02
UROBILINOGEN, POC: 0.2

## 2020-12-18 PROCEDURE — 81002 URINALYSIS NONAUTO W/O SCOPE: CPT | Performed by: PHYSICIAN ASSISTANT

## 2020-12-18 PROCEDURE — 99396 PREV VISIT EST AGE 40-64: CPT | Performed by: PHYSICIAN ASSISTANT

## 2020-12-18 RX ORDER — PLANT STANOL ESTER 450 MG
550 TABLET ORAL DAILY
COMMUNITY

## 2020-12-18 ASSESSMENT — ENCOUNTER SYMPTOMS
WHEEZING: 0
SHORTNESS OF BREATH: 0
BLOOD IN STOOL: 0
TROUBLE SWALLOWING: 0
ABDOMINAL PAIN: 0
COUGH: 0

## 2020-12-18 ASSESSMENT — PATIENT HEALTH QUESTIONNAIRE - PHQ9
SUM OF ALL RESPONSES TO PHQ QUESTIONS 1-9: 0
SUM OF ALL RESPONSES TO PHQ QUESTIONS 1-9: 0
SUM OF ALL RESPONSES TO PHQ9 QUESTIONS 1 & 2: 0
SUM OF ALL RESPONSES TO PHQ QUESTIONS 1-9: 0
1. LITTLE INTEREST OR PLEASURE IN DOING THINGS: 0
2. FEELING DOWN, DEPRESSED OR HOPELESS: 0

## 2020-12-18 NOTE — PROGRESS NOTES
2020    Tee Alejo (:  1960) is a 60 y.o. female, here for a preventive medicine evaluation. Patient Active Problem List   Diagnosis    Narcolepsy    Class 1 obesity with body mass index (BMI) of 32.0 to 32.9 in adult    PUD (peptic ulcer disease)    Non-seasonal allergic rhinitis due to pollen    Adrenal nodule (HCC)    Low serum cortisol level (HCC)    Immunodeficiency (HCC)    Diarrhea    Multinodular goiter     Diet: Keto diet, has completely cleared up her diarrhea  Exercise: nothing regular right now, walks occasionally  Dental: visit this year  Vision: no changes  Pt recently treated for UTI in November following an e-visit. She is having some reoccurring irritation with urination and would like to have a urine culture sent. She denies: hematuria, flank pain, fever or pelvic discomfort. Hx of multinodular goiter. Due for repeat US in February. Pt states that it is unlikely that her insurance will cover this. Previously seeing an immunologist for deficiency. Requesting titers to see if she has immunity for herpes zoster and pneumococcal pneumonia following her immunizations      Review of Systems   Constitutional: Negative for unexpected weight change. HENT: Negative for trouble swallowing. Eyes: Negative for visual disturbance. Respiratory: Negative for cough, shortness of breath and wheezing. Cardiovascular: Negative for chest pain, palpitations and leg swelling. Gastrointestinal: Negative for abdominal pain and blood in stool. Endocrine: Negative for polydipsia, polyphagia and polyuria. Genitourinary: Positive for dysuria. Negative for decreased urine volume, difficulty urinating, flank pain, frequency, hematuria, menstrual problem, pelvic pain, urgency, vaginal discharge and vaginal pain. Skin: Negative for pallor. Neurological: Negative for dizziness, light-headedness and headaches. Hematological: Negative for adenopathy.        Prior to Visit Medications    Medication Sig Taking? Authorizing Provider   potassium gluconate 550 (90 K) MG TABS tablet Take 550 mg by mouth daily Yes Historical Provider, MD   Misc Natural Products ([address]) Take by mouth Yes Historical Provider, MD   Multiple Vitamins-Minerals (MULTIVITAMIN ADULTS PO) Take by mouth Yes Historical Provider, MD   Cyanocobalamin (VITAMIN B 12 PO) Take by mouth Yes Historical Provider, MD   VITAMIN D PO Take 2,000 Int'l Units by mouth  Yes Historical Provider, MD   Cetirizine-Pseudoephedrine (ZYRTEC-D PO) Take by mouth Yes Historical Provider, MD        No Known Allergies    Past Medical History:   Diagnosis Date    Narcolepsy     Non-seasonal allergic rhinitis due to pollen 4/29/2019       Past Surgical History:   Procedure Laterality Date    COLONOSCOPY  1/24/2013    COLONOSCOPY N/A 10/24/2018    COLONOSCOPY with random colon biopsies for colitis performed by Joi Petit MD at [address] [place] [address] ARTHROSCOPY Right 1998    ACL replacement    UPPER GASTROINTESTINAL ENDOSCOPY N/A 10/24/2018    EGD BIOPSY small bowel for sprue performed by Joi Petit MD at [facility] N/A 4/9/2019    ESOPHAGOGASTRODUODENOSCOPY WITH ENDOSCOPIC ULTRASOUND OF ESOPHAGUS performed by Nadira Scanlon MD at [address] [facility] Marital status:      Spouse name: Not on file    Number of children: Not on file    Years of education: Not on file    Highest education level: Not on file   Occupational History    Not on file   Social Needs    Financial resource strain: Not on file    Food insecurity     Worry: Not on file     Inability: Not on file    Transportation needs     Medical: Not on file     Non-medical: Not on file   Tobacco Use    Smoking status: Never Smoker    Smokeless tobacco: Never Used   Substance and Sexual Activity    Alcohol use:  Yes     Alcohol/week: 0.0 standard drinks Comment: rarely    Drug use: No    Sexual activity: Yes     Partners: Male   Lifestyle    Physical activity     Days per week: Not on file     Minutes per session: Not on file    Stress: Not on file   Relationships    Social connections     Talks on phone: Not on file     Gets together: Not on file     Attends Worship service: Not on file     Active member of club or organization: Not on file     Attends meetings of clubs or organizations: Not on file     Relationship status: Not on file    Intimate partner violence     Fear of current or ex partner: Not on file     Emotionally abused: Not on file     Physically abused: Not on file     Forced sexual activity: Not on file   Other Topics Concern    Not on file   Social History Narrative    Not on file        Family History   Problem Relation Age of Onset    Thyroid Disease Mother     Heart Disease Father     Cancer Father         testicular    Other Sister         Seizure Disorder    Heart Defect Sister        ADVANCE DIRECTIVE: N, <no information>    Vitals:    12/18/20 1137   BP: 130/70   Pulse: 78   SpO2: 98%   Weight: 197 lb (89.4 kg)     Estimated body mass index is 31.8 kg/m² as calculated from the following:    Height as of 2/26/20: 5' 6\" (1.676 m). Weight as of this encounter: 197 lb (89.4 kg). Physical Exam  Vitals signs reviewed. Constitutional:       Appearance: Normal appearance. HENT:      Head: Normocephalic and atraumatic. Eyes:      Extraocular Movements: Extraocular movements intact. Conjunctiva/sclera: Conjunctivae normal.      Pupils: Pupils are equal, round, and reactive to light. Cardiovascular:      Rate and Rhythm: Normal rate and regular rhythm. Heart sounds: Normal heart sounds. Pulmonary:      Effort: Pulmonary effort is normal.      Breath sounds: Normal breath sounds. No wheezing or rhonchi. Abdominal:      General: Bowel sounds are normal.      Palpations: Abdomen is soft. There is no mass. Tenderness: There is no abdominal tenderness. Musculoskeletal:      Right lower leg: No edema. Left lower leg: No edema. Lymphadenopathy:      Cervical: No cervical adenopathy. Skin:     Coloration: Skin is not pale. Findings: No erythema. Neurological:      Mental Status: She is alert and oriented to person, place, and time. Cranial Nerves: No cranial nerve deficit. No flowsheet data found.     Lab Results   Component Value Date    CHOL 164 02/07/2017    CHOL 149 06/12/2014    TRIG 45 02/07/2017    TRIG 37 06/12/2014    HDL 70 02/07/2017    HDL 65 06/12/2014    LDLCALC 85 02/07/2017    LDLCALC 77 06/12/2014    GLUCOSE 89 01/24/2020    GLUCOSE 86 06/12/2014       The ASCVD Risk score (Josephine Marquez, et al., 2013) failed to calculate for the following reasons:    Cannot find a previous HDL lab    Cannot find a previous total cholesterol lab    Immunization History   Administered Date(s) Administered    Influenza Vaccine, unspecified formulation 10/21/2013, 08/21/2014, 10/14/2016, 09/16/2017, 09/22/2017, 09/11/2018    Influenza Virus Vaccine 08/21/2014, 09/16/2017, 09/19/2019, 09/09/2020    Influenza, Quadv, IM, (6 mo and older Fluzone, Flulaval, Fluarix and 3 yrs and older Afluria) 11/01/2016    Influenza, Quadv, IM, PF (6 mo and older Fluzone, Flulaval, Fluarix, and 3 yrs and older Afluria) 09/11/2018, 09/19/2019    Pneumococcal Polysaccharide ([id number]) 09/11/2019    Tdap (Boostrix, Adacel) 06/11/2014, 09/19/2019    Varicella (Varivax) 06/15/2016    Zoster Live (Zostavax) 09/19/2014       Health Maintenance   Topic Date Due    Pneumococcal 0-64 years Vaccine (2 of 3 - PCV13) 09/11/2020    Lipid screen  02/07/2022    Breast cancer screen  06/17/2022    Cervical cancer screen  07/22/2024    Colon cancer screen colonoscopy  10/24/2028    DTaP/Tdap/Td vaccine (3 - Td) 09/19/2029    Flu vaccine  Completed    Hepatitis C screen  Completed    Hepatitis A vaccine  Aged Out  Hepatitis B vaccine  Aged Out    Hib vaccine  Aged Out    Meningococcal (ACWY) vaccine  Aged Out    HIV screen  Discontinued       ASSESSMENT/PLAN:  1. Physical exam  - COMPREHENSIVE METABOLIC PANEL; Future  - CBC Auto Differential; Future  - LIPID PANEL; Future  - TSH with Reflex; Future    2. Pain with urination  -  Will await for results before trying  - POCT Urinalysis no Micro  - Culture, Urine    3. Immunodeficiency ([facility] [address]  - PNEUMOCOCCAL AB, IGG; Future  - VARICELLA ZOSTER ANTIBODY, IGG; Future      Return if symptoms worsen or fail to improve. An electronic signature was used to authenticate this note.     --SUNITHA Giraldo on 12/18/2020 at 12:50 PM

## 2020-12-20 LAB — URINE CULTURE, ROUTINE: NORMAL

## 2021-01-18 DIAGNOSIS — D84.9 IMMUNODEFICIENCY (HCC): ICD-10-CM

## 2021-01-18 DIAGNOSIS — Z00.00 PHYSICAL EXAM: ICD-10-CM

## 2021-01-18 LAB
A/G RATIO: 2.2 (ref 1.1–2.2)
ALBUMIN SERPL-MCNC: 4.6 G/DL (ref 3.4–5)
ALP BLD-CCNC: 56 U/L (ref 40–129)
ALT SERPL-CCNC: 13 U/L (ref 10–40)
ANION GAP SERPL CALCULATED.3IONS-SCNC: 12 MMOL/L (ref 3–16)
AST SERPL-CCNC: 20 U/L (ref 15–37)
BASOPHILS ABSOLUTE: 0 K/UL (ref 0–0.2)
BASOPHILS RELATIVE PERCENT: 0.6 %
BILIRUB SERPL-MCNC: 0.4 MG/DL (ref 0–1)
BUN BLDV-MCNC: 13 MG/DL (ref 7–20)
CALCIUM SERPL-MCNC: 9.3 MG/DL (ref 8.3–10.6)
CHLORIDE BLD-SCNC: 102 MMOL/L (ref 99–110)
CHOLESTEROL, TOTAL: 167 MG/DL (ref 0–199)
CO2: 25 MMOL/L (ref 21–32)
CREAT SERPL-MCNC: 0.6 MG/DL (ref 0.6–1.2)
EOSINOPHILS ABSOLUTE: 0.1 K/UL (ref 0–0.6)
EOSINOPHILS RELATIVE PERCENT: 3 %
GFR AFRICAN AMERICAN: >60
GFR NON-AFRICAN AMERICAN: >60
GLOBULIN: 2.1 G/DL
GLUCOSE BLD-MCNC: 95 MG/DL (ref 70–99)
HCT VFR BLD CALC: 40.1 % (ref 36–48)
HDLC SERPL-MCNC: 70 MG/DL (ref 40–60)
HEMOGLOBIN: 13.8 G/DL (ref 12–16)
LDL CHOLESTEROL CALCULATED: 89 MG/DL
LYMPHOCYTES ABSOLUTE: 1.5 K/UL (ref 1–5.1)
LYMPHOCYTES RELATIVE PERCENT: 35.8 %
MCH RBC QN AUTO: 31.1 PG (ref 26–34)
MCHC RBC AUTO-ENTMCNC: 34.5 G/DL (ref 31–36)
MCV RBC AUTO: 90.1 FL (ref 80–100)
MONOCYTES ABSOLUTE: 0.3 K/UL (ref 0–1.3)
MONOCYTES RELATIVE PERCENT: 7.3 %
NEUTROPHILS ABSOLUTE: 2.2 K/UL (ref 1.7–7.7)
NEUTROPHILS RELATIVE PERCENT: 53.3 %
PDW BLD-RTO: 14.1 % (ref 12.4–15.4)
PLATELET # BLD: 198 K/UL (ref 135–450)
PMV BLD AUTO: 9 FL (ref 5–10.5)
POTASSIUM SERPL-SCNC: 4.2 MMOL/L (ref 3.5–5.1)
RBC # BLD: 4.45 M/UL (ref 4–5.2)
SODIUM BLD-SCNC: 139 MMOL/L (ref 136–145)
TOTAL PROTEIN: 6.7 G/DL (ref 6.4–8.2)
TRIGL SERPL-MCNC: 41 MG/DL (ref 0–150)
TSH REFLEX: 3.29 UIU/ML (ref 0.27–4.2)
VLDLC SERPL CALC-MCNC: 8 MG/DL
WBC # BLD: 4.1 K/UL (ref 4–11)

## 2021-01-19 LAB — VARICELLA-ZOSTER VIRUS AB, IGG: NORMAL

## 2021-01-20 LAB
PNEUMOCOCCAL ANTIBODY TYPE 12F: 0.05 UG/ML
PNEUMOCOCCAL ANTIBODY TYPE 14: 0.8 UG/ML
PNEUMOCOCCAL ANTIBODY TYPE 18C: 0.15 UG/ML
PNEUMOCOCCAL ANTIBODY TYPE 19F: 0.44 UG/ML
PNEUMOCOCCAL ANTIBODY TYPE 1: 0.15 UG/ML
PNEUMOCOCCAL ANTIBODY TYPE 23F: 0.69 UG/ML
PNEUMOCOCCAL ANTIBODY TYPE 3: 0.67 UG/ML
PNEUMOCOCCAL ANTIBODY TYPE 4: 0.08 UG/ML
PNEUMOCOCCAL ANTIBODY TYPE 5: 1.05 UG/ML
PNEUMOCOCCAL ANTIBODY TYPE 6B: 0.09 UG/ML
PNEUMOCOCCAL ANTIBODY TYPE 7F: 0.17 UG/ML
PNEUMOCOCCAL ANTIBODY TYPE 8: 0.46 UG/ML
PNEUMOCOCCAL ANTIBODY TYPE 9N: 0.28 UG/ML
PNEUMOCOCCAL ANTIBODY TYPE 9V: 0.14 UG/ML
PNEUMOCOCCAL INTERPRETATION: NORMAL

## 2021-04-19 ENCOUNTER — TELEPHONE (OUTPATIENT)
Dept: FAMILY MEDICINE CLINIC | Age: 61
End: 2021-04-19

## 2021-04-19 NOTE — TELEPHONE ENCOUNTER
This is not recommended by the CDC and the results don't accurately reflect protection from the virus. I discourage doing any testing at this time.

## 2021-04-19 NOTE — TELEPHONE ENCOUNTER
Informed patient, pt expressed understanding and agreement   Pt states in past with shingles vaccine, [facility] informed her t-cells dont produce antibodies & labs would not indicate any vaccination had been completed for shingles. So patient wanted to make sure everything was ok after covid vaccination. Pt is ok with plan, and no further questions/needs at this time.

## 2021-04-19 NOTE — TELEPHONE ENCOUNTER
Patient called and stated she would like someone to reach out to her to schedule an appointment to have blood taken to see if the covid vaccine is working? Stated she has had this done before.     Please advise

## 2021-08-05 ENCOUNTER — TELEPHONE (OUTPATIENT)
Dept: FAMILY MEDICINE CLINIC | Age: 61
End: 2021-08-05

## 2021-08-05 NOTE — TELEPHONE ENCOUNTER
Pt calling because she needs to have another shingle shot but the pharmacy stated that they will not give it to the pt unless Dr. Lori Pace sends over an rx for the shot, to the [place] [address] in [place]. [place].  Please Advise

## 2021-08-06 RX ORDER — ZOSTER VACCINE RECOMBINANT, ADJUVANTED 50 MCG/0.5
0.5 KIT INTRAMUSCULAR SEE ADMIN INSTRUCTIONS
Qty: 0.5 ML | Refills: 0 | Status: SHIPPED | OUTPATIENT
Start: 2021-08-06 | End: 2021-08-07

## 2021-08-06 NOTE — TELEPHONE ENCOUNTER
Please call [facility]. [place]. I am not understanding the request. Let me know.  Thanks, Lisa Hay

## 2021-08-30 ENCOUNTER — HOSPITAL ENCOUNTER (OUTPATIENT)
Dept: MAMMOGRAPHY | Age: 61
Discharge: HOME | End: 2021-08-30
Payer: COMMERCIAL

## 2021-08-30 DIAGNOSIS — Z12.31 BREAST CANCER SCREENING BY MAMMOGRAM: ICD-10-CM

## 2021-08-30 PROCEDURE — 77063 BREAST TOMOSYNTHESIS BI: CPT

## 2022-09-04 ENCOUNTER — TELEPHONE (OUTPATIENT)
Dept: FAMILY MEDICINE CLINIC | Age: 62
End: 2022-09-04

## 2022-09-04 RX ORDER — NIRMATRELVIR AND RITONAVIR 300-100 MG
KIT ORAL
Qty: 30 TABLET | Refills: 0 | Status: SHIPPED | OUTPATIENT
Start: 2022-09-04 | End: 2022-09-09

## 2022-09-04 NOTE — TELEPHONE ENCOUNTER
Received call from patient today regarding positive COVID test this morning. She states that her  did test positive yesterday as well. She states that she does have a sore throat and some sinus congestion. She denies any high fever. She does report she is running about 99. Denies any shortness of breath, cough or hemoptysis. She is inquiring about antiviral therapy. Antiviral therapy,paxlovid sent to [address] [place] [address]. She was instructed should she develop worsening symptoms of shortness of breath, hemoptysis or chest pain she can always call back.

## 2022-11-18 ENCOUNTER — HOSPITAL ENCOUNTER (OUTPATIENT)
Dept: MAMMOGRAPHY | Age: 62
Discharge: HOME | End: 2022-11-18
Payer: COMMERCIAL

## 2022-11-18 DIAGNOSIS — Z12.31 BREAST CANCER SCREENING BY MAMMOGRAM: ICD-10-CM

## 2022-11-18 PROCEDURE — 77067 SCR MAMMO BI INCL CAD: CPT

## 2022-11-21 ENCOUNTER — OFFICE VISIT (OUTPATIENT)
Dept: FAMILY MEDICINE CLINIC | Age: 62
End: 2022-11-21
Payer: COMMERCIAL

## 2022-11-21 VITALS
BODY MASS INDEX: 31.47 KG/M2 | SYSTOLIC BLOOD PRESSURE: 122 MMHG | HEART RATE: 69 BPM | DIASTOLIC BLOOD PRESSURE: 80 MMHG | WEIGHT: 195 LBS | OXYGEN SATURATION: 99 %

## 2022-11-21 DIAGNOSIS — E04.1 THYROID NODULE: ICD-10-CM

## 2022-11-21 DIAGNOSIS — Z00.00 ANNUAL PHYSICAL EXAM: ICD-10-CM

## 2022-11-21 DIAGNOSIS — D72.829 LEUKOCYTOSIS, UNSPECIFIED TYPE: Primary | ICD-10-CM

## 2022-11-21 DIAGNOSIS — R92.8 ABNORMAL MAMMOGRAM: Primary | ICD-10-CM

## 2022-11-21 LAB
A/G RATIO: 2 (ref 1.1–2.2)
ALBUMIN SERPL-MCNC: 4.5 G/DL (ref 3.4–5)
ALP BLD-CCNC: 60 U/L (ref 40–129)
ALT SERPL-CCNC: 16 U/L (ref 10–40)
ANION GAP SERPL CALCULATED.3IONS-SCNC: 14 MMOL/L (ref 3–16)
AST SERPL-CCNC: 23 U/L (ref 15–37)
BILIRUB SERPL-MCNC: 0.5 MG/DL (ref 0–1)
BUN BLDV-MCNC: 12 MG/DL (ref 7–20)
CALCIUM SERPL-MCNC: 9.4 MG/DL (ref 8.3–10.6)
CHLORIDE BLD-SCNC: 103 MMOL/L (ref 99–110)
CHOLESTEROL, TOTAL: 190 MG/DL (ref 0–199)
CO2: 25 MMOL/L (ref 21–32)
CREAT SERPL-MCNC: 0.6 MG/DL (ref 0.6–1.2)
GFR SERPL CREATININE-BSD FRML MDRD: >60 ML/MIN/{1.73_M2}
GLUCOSE BLD-MCNC: 86 MG/DL (ref 70–99)
HCT VFR BLD CALC: 38.1 % (ref 36–48)
HDLC SERPL-MCNC: 71 MG/DL (ref 40–60)
HEMOGLOBIN: 13.1 G/DL (ref 12–16)
LDL CHOLESTEROL CALCULATED: 109 MG/DL
MCH RBC QN AUTO: 31.1 PG (ref 26–34)
MCHC RBC AUTO-ENTMCNC: 34.5 G/DL (ref 31–36)
MCV RBC AUTO: 90 FL (ref 80–100)
PDW BLD-RTO: 14.1 % (ref 12.4–15.4)
PLATELET # BLD: 198 K/UL (ref 135–450)
PMV BLD AUTO: 8.9 FL (ref 5–10.5)
POTASSIUM SERPL-SCNC: 4.4 MMOL/L (ref 3.5–5.1)
RBC # BLD: 4.23 M/UL (ref 4–5.2)
SODIUM BLD-SCNC: 142 MMOL/L (ref 136–145)
TOTAL PROTEIN: 6.8 G/DL (ref 6.4–8.2)
TRIGL SERPL-MCNC: 51 MG/DL (ref 0–150)
TSH REFLEX: 2.25 UIU/ML (ref 0.27–4.2)
VLDLC SERPL CALC-MCNC: 10 MG/DL
WBC # BLD: 3.8 K/UL (ref 4–11)

## 2022-11-21 PROCEDURE — G8484 FLU IMMUNIZE NO ADMIN: HCPCS | Performed by: NURSE PRACTITIONER

## 2022-11-21 PROCEDURE — 99396 PREV VISIT EST AGE 40-64: CPT | Performed by: NURSE PRACTITIONER

## 2022-11-21 ASSESSMENT — PATIENT HEALTH QUESTIONNAIRE - PHQ9
SUM OF ALL RESPONSES TO PHQ QUESTIONS 1-9: 0
7. TROUBLE CONCENTRATING ON THINGS, SUCH AS READING THE NEWSPAPER OR WATCHING TELEVISION: 0
SUM OF ALL RESPONSES TO PHQ QUESTIONS 1-9: 0
10. IF YOU CHECKED OFF ANY PROBLEMS, HOW DIFFICULT HAVE THESE PROBLEMS MADE IT FOR YOU TO DO YOUR WORK, TAKE CARE OF THINGS AT HOME, OR GET ALONG WITH OTHER PEOPLE: 0
3. TROUBLE FALLING OR STAYING ASLEEP: 0
SUM OF ALL RESPONSES TO PHQ QUESTIONS 1-9: 0
2. FEELING DOWN, DEPRESSED OR HOPELESS: 0
6. FEELING BAD ABOUT YOURSELF - OR THAT YOU ARE A FAILURE OR HAVE LET YOURSELF OR YOUR FAMILY DOWN: 0
SUM OF ALL RESPONSES TO PHQ9 QUESTIONS 1 & 2: 0
1. LITTLE INTEREST OR PLEASURE IN DOING THINGS: 0
5. POOR APPETITE OR OVEREATING: 0
SUM OF ALL RESPONSES TO PHQ QUESTIONS 1-9: 0
4. FEELING TIRED OR HAVING LITTLE ENERGY: 0
9. THOUGHTS THAT YOU WOULD BE BETTER OFF DEAD, OR OF HURTING YOURSELF: 0
8. MOVING OR SPEAKING SO SLOWLY THAT OTHER PEOPLE COULD HAVE NOTICED. OR THE OPPOSITE, BEING SO FIGETY OR RESTLESS THAT YOU HAVE BEEN MOVING AROUND A LOT MORE THAN USUAL: 0

## 2022-11-21 ASSESSMENT — ANXIETY QUESTIONNAIRES
4. TROUBLE RELAXING: 0
IF YOU CHECKED OFF ANY PROBLEMS ON THIS QUESTIONNAIRE, HOW DIFFICULT HAVE THESE PROBLEMS MADE IT FOR YOU TO DO YOUR WORK, TAKE CARE OF THINGS AT HOME, OR GET ALONG WITH OTHER PEOPLE: NOT DIFFICULT AT ALL
3. WORRYING TOO MUCH ABOUT DIFFERENT THINGS: 0
6. BECOMING EASILY ANNOYED OR IRRITABLE: 0
GAD7 TOTAL SCORE: 0
5. BEING SO RESTLESS THAT IT IS HARD TO SIT STILL: 0
2. NOT BEING ABLE TO STOP OR CONTROL WORRYING: 0
1. FEELING NERVOUS, ANXIOUS, OR ON EDGE: 0
7. FEELING AFRAID AS IF SOMETHING AWFUL MIGHT HAPPEN: 0

## 2022-11-21 ASSESSMENT — ENCOUNTER SYMPTOMS
CONSTIPATION: 0
DIARRHEA: 0
SHORTNESS OF BREATH: 0
NAUSEA: 0
COUGH: 0
CHEST TIGHTNESS: 0
VOMITING: 0
WHEEZING: 0

## 2022-11-21 NOTE — PROGRESS NOTES
2022    Beti Nair (:  1960) is a 57 y.o. female, here for a preventive medicine evaluation. Patient Active Problem List   Diagnosis    Narcolepsy    Class 1 obesity with body mass index (BMI) of 32.0 to 32.9 in adult    PUD (peptic ulcer disease)    Non-seasonal allergic rhinitis due to pollen    Adrenal nodule (HCC)    Low serum cortisol level    Immunodeficiency ([address]    Diarrhea    Multinodular goiter   Presenting for annual physical.    Last Pap: 2019. Following with gyn  Self-breast exams: yes  FH of breast cancer: maternal great aunt   FH of GYN cancer: maternal grandmother-cervical.     Hx of multinodular goiter. Due for repeat US, not done since . Pt states that it is unlikely that her insurance will cover this. Exercise-walking daily, goal of 10,000 steps daily. Sleep-good. Diet-tries to lose weight. Tracking food intake. Hair thinning-ongoing. Appetite normal. Not itchy. Dental exam-up to date  Eye exam-up to date.   Mammogram-2022-rec's for f/u imaging. Review of Systems   Constitutional:  Negative for activity change, appetite change, fatigue, fever and unexpected weight change. HENT: Negative. Respiratory:  Negative for cough, chest tightness, shortness of breath and wheezing. Cardiovascular:  Negative for chest pain, palpitations and leg swelling. Gastrointestinal:  Negative for constipation, diarrhea, nausea and vomiting. Genitourinary: Negative. Negative for difficulty urinating and dysuria. Musculoskeletal: Negative. Negative for gait problem. Neurological: Negative. Negative for dizziness, syncope, weakness, light-headedness, numbness and headaches. Psychiatric/Behavioral: Negative. Prior to Visit Medications    Medication Sig Taking?  Authorizing Provider   potassium gluconate 550 (90 K) MG TABS tablet Take 550 mg by mouth daily  Historical Provider, MD   [facility] [employer] ([address] [facility]) Take by mouth  Historical Provider, MD   Multiple Vitamins-Minerals (MULTIVITAMIN ADULTS PO) Take by mouth  Historical Provider, MD   Cyanocobalamin (VITAMIN B 12 PO) Take by mouth  Historical Provider, MD   VITAMIN D PO Take 2,000 Int'l Units by mouth   Historical Provider, MD   Cetirizine-Pseudoephedrine (ZYRTEC-D PO) Take by mouth  Historical Provider, MD        No Known Allergies    Past Medical History:   Diagnosis Date    Narcolepsy     Non-seasonal allergic rhinitis due to pollen 4/29/2019       Past Surgical History:   Procedure Laterality Date    COLONOSCOPY  1/24/2013    COLONOSCOPY N/A 10/24/2018    COLONOSCOPY with random colon biopsies for colitis performed by Daisy Chery MD at [address] [place] [address] ARTHROSCOPY Right 1998    ACL replacement    UPPER GASTROINTESTINAL ENDOSCOPY N/A 10/24/2018    EGD BIOPSY small bowel for sprue performed by Daisy Chery MD at [place] [address] N/A 4/9/2019    ESOPHAGOGASTRODUODENOSCOPY WITH ENDOSCOPIC ULTRASOUND OF ESOPHAGUS performed by Vijay Herrera MD at [facility] History     Socioeconomic History    Marital status:      Spouse name: Not on file    Number of children: Not on file    Years of education: Not on file    Highest education level: Not on file   Occupational History    Not on file   Tobacco Use    Smoking status: Never    Smokeless tobacco: Never   Vaping Use    Vaping Use: Never used   Substance and Sexual Activity    Alcohol use:  Yes     Alcohol/week: 0.0 standard drinks     Comment: rarely    Drug use: No    Sexual activity: Yes     Partners: Male   Other Topics Concern    Not on file   Social History Narrative    Not on file     Social Determinants of Health     Financial Resource Strain: Not on file   Food Insecurity: Not on file   Transportation Needs: Not on file   Physical Activity: Not on file   Stress: Not on file   Social Connections: Not on file   Intimate Partner Violence: Not on file   Housing Stability: Not on file        Family History   Problem Relation Age of Onset    Thyroid Disease Mother     Heart Disease Father     Cancer Father         testicular    Other Sister         Seizure Disorder    Heart Defect Sister     Ovarian Cancer Maternal Grandmother        ADVANCE DIRECTIVE: N, <no information>    Vitals:    11/21/22 0841   BP: 122/80   Site: Left Upper Arm   Position: Sitting   Cuff Size: Large Adult   Pulse: 69   SpO2: 99%   Weight: 195 lb (88.5 kg)     Estimated body mass index is 31.47 kg/m² as calculated from the following:    Height as of 2/26/20: 5' 6\" (1.676 m). Weight as of this encounter: 195 lb (88.5 kg). Physical Exam  Vitals reviewed. Constitutional:       General: She is not in acute distress. Appearance: Normal appearance. She is not ill-appearing. HENT:      Head: Normocephalic and atraumatic. Right Ear: Tympanic membrane and external ear normal.      Left Ear: Tympanic membrane and external ear normal.      Nose: Nose normal.      Mouth/Throat:      Mouth: Mucous membranes are moist.      Pharynx: No oropharyngeal exudate or posterior oropharyngeal erythema. Eyes:      Conjunctiva/sclera: Conjunctivae normal.      Pupils: Pupils are equal, round, and reactive to light. Neck:      Vascular: No carotid bruit. Cardiovascular:      Rate and Rhythm: Normal rate and regular rhythm. Pulses: Normal pulses. Heart sounds: Normal heart sounds. Pulmonary:      Effort: Pulmonary effort is normal. No respiratory distress. Breath sounds: Normal breath sounds. No wheezing or rhonchi. Chest:      Chest wall: No tenderness. Abdominal:      General: Abdomen is flat. Bowel sounds are normal.      Palpations: Abdomen is soft. Tenderness: There is no abdominal tenderness. There is no right CVA tenderness, left CVA tenderness or guarding. Musculoskeletal:         General: Normal range of motion.       Cervical back: Normal range of motion and neck supple. No rigidity. Right lower leg: Edema (trace) present. Left lower leg: Edema (trace) present. Lymphadenopathy:      Cervical: No cervical adenopathy. Skin:     General: Skin is warm and dry. Capillary Refill: Capillary refill takes less than 2 seconds. Neurological:      General: No focal deficit present. Mental Status: She is alert and oriented to person, place, and time. Mental status is at baseline. Psychiatric:         Mood and Affect: Mood normal.         Behavior: Behavior normal.         Thought Content: Thought content normal.         Judgment: Judgment normal.       No flowsheet data found. Lab Results   Component Value Date/Time    CHOL 167 01/18/2021 10:18 AM    CHOL 164 02/07/2017 09:24 AM    CHOL 149 06/12/2014 12:00 AM    TRIG 41 01/18/2021 10:18 AM    TRIG 45 02/07/2017 09:24 AM    TRIG 37 06/12/2014 12:00 AM    HDL 70 01/18/2021 10:18 AM    HDL 70 02/07/2017 09:24 AM    HDL 65 06/12/2014 12:00 AM    LDLCALC 89 01/18/2021 10:18 AM    LDLCALC 85 02/07/2017 09:24 AM    LDLCALC 77 06/12/2014 12:00 AM    GLUCOSE 95 01/18/2021 10:18 AM    GLUCOSE 86 06/12/2014 12:00 AM       The ASCVD Risk score (Clem GARCIA, et al., 2019) failed to calculate for the following reasons:     The systolic blood pressure is missing    Immunization History   Administered Date(s) Administered    Influenza Vaccine, unspecified formulation 10/21/2013, 08/21/2014, 10/14/2016, 09/16/2017, 09/22/2017, 09/11/2018    Influenza Virus Vaccine 08/21/2014, 09/16/2017, 09/19/2019, 09/09/2020    Influenza, AFLURIA (age 1 yrs+), FLUZONE, (age 10 mo+), MDV, 0.5mL 11/01/2016    Influenza, FLUARIX, FLULAVAL, FLUZONE (age 10 mo+) AND AFLURIA, (age 1 y+), PF, 0.5mL 09/11/2018, 09/19/2019    Pneumococcal Polysaccharide ([id number]) 09/11/2019    Tdap (Boostrix, Adacel) 06/11/2014, 09/19/2019    Varicella (Varivax) 06/15/2016    Zoster Live (Zostavax) 09/19/2014       Health Maintenance   Topic Date

## 2022-11-23 ENCOUNTER — HOSPITAL ENCOUNTER (OUTPATIENT)
Dept: ULTRASOUND IMAGING | Age: 62
Discharge: HOME | End: 2022-11-23
Payer: COMMERCIAL

## 2022-11-23 DIAGNOSIS — E04.1 THYROID NODULE: ICD-10-CM

## 2022-11-23 PROCEDURE — 76536 US EXAM OF HEAD AND NECK: CPT

## 2022-12-05 ENCOUNTER — HOSPITAL ENCOUNTER (OUTPATIENT)
Dept: WOMENS IMAGING | Age: 62
Discharge: HOME | End: 2022-12-05
Payer: COMMERCIAL

## 2022-12-05 DIAGNOSIS — R92.8 ABNORMAL MAMMOGRAM: ICD-10-CM

## 2022-12-05 PROCEDURE — G0279 TOMOSYNTHESIS, MAMMO: HCPCS

## 2022-12-26 ENCOUNTER — TELEPHONE (OUTPATIENT)
Dept: PRIMARY CARE CLINIC | Age: 62
End: 2022-12-26

## 2022-12-26 DIAGNOSIS — J01.10 ACUTE NON-RECURRENT FRONTAL SINUSITIS: Primary | ICD-10-CM

## 2022-12-26 RX ORDER — AZITHROMYCIN 250 MG/1
250 TABLET, FILM COATED ORAL SEE ADMIN INSTRUCTIONS
Qty: 6 TABLET | Refills: 0 | Status: SHIPPED | OUTPATIENT
Start: 2022-12-26 | End: 2022-12-31

## 2022-12-26 NOTE — TELEPHONE ENCOUNTER
Patient c/o cough, congestion and sore throat that started on 12/16/2022. Has used mucnex, theraflu and José Miguel Rily but is getting worse.  Patient is now coughing up greenish phlegm, denied any shortness of breath

## 2023-02-16 DIAGNOSIS — D72.819 LEUKOPENIA, UNSPECIFIED TYPE: Primary | ICD-10-CM

## 2023-02-21 DIAGNOSIS — D72.819 LEUKOPENIA, UNSPECIFIED TYPE: ICD-10-CM

## 2023-02-21 LAB — BLOOD SMEAR REVIEW: NORMAL

## 2023-02-22 LAB — HEMATOLOGY PATH CONSULT: NORMAL

## 2023-02-26 DIAGNOSIS — D72.819 LEUKOPENIA, UNSPECIFIED TYPE: Primary | ICD-10-CM

## 2023-02-27 ENCOUNTER — OFFICE VISIT (OUTPATIENT)
Dept: FAMILY MEDICINE CLINIC | Age: 63
End: 2023-02-27
Payer: COMMERCIAL

## 2023-02-27 VITALS
BODY MASS INDEX: 31.47 KG/M2 | HEART RATE: 78 BPM | SYSTOLIC BLOOD PRESSURE: 120 MMHG | DIASTOLIC BLOOD PRESSURE: 78 MMHG | WEIGHT: 195 LBS | OXYGEN SATURATION: 100 %

## 2023-02-27 DIAGNOSIS — M19.90 ARTHRITIS: ICD-10-CM

## 2023-02-27 DIAGNOSIS — L65.9 HAIR LOSS: ICD-10-CM

## 2023-02-27 DIAGNOSIS — D72.819 LEUKOPENIA, UNSPECIFIED TYPE: ICD-10-CM

## 2023-02-27 DIAGNOSIS — L65.9 HAIR LOSS: Primary | ICD-10-CM

## 2023-02-27 PROCEDURE — G8427 DOCREV CUR MEDS BY ELIG CLIN: HCPCS | Performed by: NURSE PRACTITIONER

## 2023-02-27 PROCEDURE — 99213 OFFICE O/P EST LOW 20 MIN: CPT | Performed by: NURSE PRACTITIONER

## 2023-02-27 PROCEDURE — G8417 CALC BMI ABV UP PARAM F/U: HCPCS | Performed by: NURSE PRACTITIONER

## 2023-02-27 PROCEDURE — 3017F COLORECTAL CA SCREEN DOC REV: CPT | Performed by: NURSE PRACTITIONER

## 2023-02-27 PROCEDURE — 1036F TOBACCO NON-USER: CPT | Performed by: NURSE PRACTITIONER

## 2023-02-27 PROCEDURE — G8484 FLU IMMUNIZE NO ADMIN: HCPCS | Performed by: NURSE PRACTITIONER

## 2023-02-27 SDOH — ECONOMIC STABILITY: FOOD INSECURITY: WITHIN THE PAST 12 MONTHS, YOU WORRIED THAT YOUR FOOD WOULD RUN OUT BEFORE YOU GOT MONEY TO BUY MORE.: NEVER TRUE

## 2023-02-27 SDOH — ECONOMIC STABILITY: INCOME INSECURITY: HOW HARD IS IT FOR YOU TO PAY FOR THE VERY BASICS LIKE FOOD, HOUSING, MEDICAL CARE, AND HEATING?: NOT HARD AT ALL

## 2023-02-27 SDOH — ECONOMIC STABILITY: HOUSING INSECURITY
IN THE LAST 12 MONTHS, WAS THERE A TIME WHEN YOU DID NOT HAVE A STEADY PLACE TO SLEEP OR SLEPT IN A SHELTER (INCLUDING NOW)?: NO

## 2023-02-27 SDOH — ECONOMIC STABILITY: FOOD INSECURITY: WITHIN THE PAST 12 MONTHS, THE FOOD YOU BOUGHT JUST DIDN'T LAST AND YOU DIDN'T HAVE MONEY TO GET MORE.: NEVER TRUE

## 2023-02-27 SDOH — ECONOMIC STABILITY: TRANSPORTATION INSECURITY
IN THE PAST 12 MONTHS, HAS LACK OF TRANSPORTATION KEPT YOU FROM MEETINGS, WORK, OR FROM GETTING THINGS NEEDED FOR DAILY LIVING?: NO

## 2023-02-27 ASSESSMENT — PATIENT HEALTH QUESTIONNAIRE - PHQ9
SUM OF ALL RESPONSES TO PHQ QUESTIONS 1-9: 1
5. POOR APPETITE OR OVEREATING: 0
SUM OF ALL RESPONSES TO PHQ QUESTIONS 1-9: 1
4. FEELING TIRED OR HAVING LITTLE ENERGY: 0
SUM OF ALL RESPONSES TO PHQ QUESTIONS 1-9: 1
3. TROUBLE FALLING OR STAYING ASLEEP: 1
SUM OF ALL RESPONSES TO PHQ QUESTIONS 1-9: 1
2. FEELING DOWN, DEPRESSED OR HOPELESS: 0
1. LITTLE INTEREST OR PLEASURE IN DOING THINGS: 0
6. FEELING BAD ABOUT YOURSELF - OR THAT YOU ARE A FAILURE OR HAVE LET YOURSELF OR YOUR FAMILY DOWN: 0
SUM OF ALL RESPONSES TO PHQ9 QUESTIONS 1 & 2: 0
9. THOUGHTS THAT YOU WOULD BE BETTER OFF DEAD, OR OF HURTING YOURSELF: 0
7. TROUBLE CONCENTRATING ON THINGS, SUCH AS READING THE NEWSPAPER OR WATCHING TELEVISION: 0
10. IF YOU CHECKED OFF ANY PROBLEMS, HOW DIFFICULT HAVE THESE PROBLEMS MADE IT FOR YOU TO DO YOUR WORK, TAKE CARE OF THINGS AT HOME, OR GET ALONG WITH OTHER PEOPLE: 0
8. MOVING OR SPEAKING SO SLOWLY THAT OTHER PEOPLE COULD HAVE NOTICED. OR THE OPPOSITE, BEING SO FIGETY OR RESTLESS THAT YOU HAVE BEEN MOVING AROUND A LOT MORE THAN USUAL: 0

## 2023-02-27 ASSESSMENT — ANXIETY QUESTIONNAIRES
2. NOT BEING ABLE TO STOP OR CONTROL WORRYING: 0
1. FEELING NERVOUS, ANXIOUS, OR ON EDGE: 0
6. BECOMING EASILY ANNOYED OR IRRITABLE: 0
7. FEELING AFRAID AS IF SOMETHING AWFUL MIGHT HAPPEN: 0
5. BEING SO RESTLESS THAT IT IS HARD TO SIT STILL: 0
GAD7 TOTAL SCORE: 0
3. WORRYING TOO MUCH ABOUT DIFFERENT THINGS: 0
4. TROUBLE RELAXING: 0
IF YOU CHECKED OFF ANY PROBLEMS ON THIS QUESTIONNAIRE, HOW DIFFICULT HAVE THESE PROBLEMS MADE IT FOR YOU TO DO YOUR WORK, TAKE CARE OF THINGS AT HOME, OR GET ALONG WITH OTHER PEOPLE: NOT DIFFICULT AT ALL

## 2023-02-27 ASSESSMENT — ENCOUNTER SYMPTOMS
CONSTIPATION: 0
COUGH: 0
SHORTNESS OF BREATH: 0
NAUSEA: 0
VOMITING: 0
WHEEZING: 0
CHEST TIGHTNESS: 0
DIARRHEA: 0

## 2023-02-27 NOTE — PROGRESS NOTES
2023  Shelley Noel (: 1960)  60 y.o.    ASSESSMENT and PLAN:  Fran Mathew was seen today for alopecia and hand problem. Diagnoses and all orders for this visit:    Hair loss  -     Vitamin D 25 Hydroxy; Future  -     Vitamin B12; Future  -     Iron and TIBC; Future  -     Ferritin; Future  -rule out vitamin deficiency. -reviewed lifestyle recommendations like avoiding over-shampooing hair, avoid tight headbands or hair ties. Arthritis  -recommend daily exercises/stretching to aid with discomfort.   -education added to avs.  -Recommend rest, avoid strenuous activity or heavy lifting. Can try ice/heat intermittently. Can alternate tylenol, and NSAID (if tolerated-take with food). If no improvement in 4wks, please contact office. Return if symptoms worsen or fail to improve. Hand Problem  Pertinent negatives include no chest pain, coughing, fatigue, fever, headaches, nausea, numbness, vomiting or weakness. Presenting for c/o hair loss. Ongoing for one year. Shedding more. No areas of baldness or significant thinning to any particular area. Hair loss has been consistent. Went through menopause about 10 years ago. Walks 10,000 steps a day. Not on many medications. No new shampoos. Only using shampoo a few times a week. No significant change recently. No rash or itchiness observed. No new hair products. C/o bilateral finger discomfort. Onset-months ago. Ongoing symptoms. Seems to be worsening. Feels like there has been change in distal joint of fingers. More pain. Review of Systems   Constitutional:  Negative for activity change, appetite change, fatigue, fever and unexpected weight change. Respiratory:  Negative for cough, chest tightness, shortness of breath and wheezing. Cardiovascular:  Negative for chest pain, palpitations and leg swelling. Gastrointestinal:  Negative for constipation, diarrhea, nausea and vomiting.    Genitourinary:

## 2023-02-28 LAB
BASOPHILS ABSOLUTE: 0 K/UL (ref 0–0.2)
BASOPHILS RELATIVE PERCENT: 0.4 %
EOSINOPHILS ABSOLUTE: 0.1 K/UL (ref 0–0.6)
EOSINOPHILS RELATIVE PERCENT: 2 %
FERRITIN: 65.4 NG/ML (ref 15–150)
HCT VFR BLD CALC: 39 % (ref 36–48)
HEMOGLOBIN: 13.7 G/DL (ref 12–16)
IRON SATURATION: 27 % (ref 15–50)
IRON: 75 UG/DL (ref 37–145)
LYMPHOCYTES ABSOLUTE: 1.3 K/UL (ref 1–5.1)
LYMPHOCYTES RELATIVE PERCENT: 32.3 %
MCH RBC QN AUTO: 31.4 PG (ref 26–34)
MCHC RBC AUTO-ENTMCNC: 35.1 G/DL (ref 31–36)
MCV RBC AUTO: 89.4 FL (ref 80–100)
MONOCYTES ABSOLUTE: 0.4 K/UL (ref 0–1.3)
MONOCYTES RELATIVE PERCENT: 9.8 %
NEUTROPHILS ABSOLUTE: 2.2 K/UL (ref 1.7–7.7)
NEUTROPHILS RELATIVE PERCENT: 55.5 %
PDW BLD-RTO: 13.9 % (ref 12.4–15.4)
PLATELET # BLD: 221 K/UL (ref 135–450)
PMV BLD AUTO: 9.4 FL (ref 5–10.5)
RBC # BLD: 4.36 M/UL (ref 4–5.2)
TOTAL IRON BINDING CAPACITY: 280 UG/DL (ref 260–445)
VITAMIN B-12: 763 PG/ML (ref 211–911)
VITAMIN D 25-HYDROXY: 57 NG/ML
WBC # BLD: 4 K/UL (ref 4–11)

## 2023-05-18 ENCOUNTER — OFFICE VISIT (OUTPATIENT)
Dept: FAMILY MEDICINE CLINIC | Age: 63
End: 2023-05-18
Payer: COMMERCIAL

## 2023-05-18 VITALS
WEIGHT: 201 LBS | HEIGHT: 66 IN | OXYGEN SATURATION: 99 % | DIASTOLIC BLOOD PRESSURE: 80 MMHG | SYSTOLIC BLOOD PRESSURE: 130 MMHG | HEART RATE: 71 BPM | TEMPERATURE: 97.7 F | BODY MASS INDEX: 32.3 KG/M2

## 2023-05-18 DIAGNOSIS — R10.9 FLANK PAIN: Primary | ICD-10-CM

## 2023-05-18 DIAGNOSIS — R39.89 SUSPECTED UTI: ICD-10-CM

## 2023-05-18 DIAGNOSIS — R82.998 LEUKOCYTES IN URINE: ICD-10-CM

## 2023-05-18 PROCEDURE — 1036F TOBACCO NON-USER: CPT | Performed by: NURSE PRACTITIONER

## 2023-05-18 PROCEDURE — G8417 CALC BMI ABV UP PARAM F/U: HCPCS | Performed by: NURSE PRACTITIONER

## 2023-05-18 PROCEDURE — 3017F COLORECTAL CA SCREEN DOC REV: CPT | Performed by: NURSE PRACTITIONER

## 2023-05-18 PROCEDURE — 81002 URINALYSIS NONAUTO W/O SCOPE: CPT | Performed by: NURSE PRACTITIONER

## 2023-05-18 PROCEDURE — G8427 DOCREV CUR MEDS BY ELIG CLIN: HCPCS | Performed by: NURSE PRACTITIONER

## 2023-05-18 PROCEDURE — 99213 OFFICE O/P EST LOW 20 MIN: CPT | Performed by: NURSE PRACTITIONER

## 2023-05-18 RX ORDER — SULFAMETHOXAZOLE AND TRIMETHOPRIM 800; 160 MG/1; MG/1
1 TABLET ORAL 2 TIMES DAILY
Qty: 14 TABLET | Refills: 0 | Status: SHIPPED | OUTPATIENT
Start: 2023-05-18 | End: 2023-05-25

## 2023-05-18 ASSESSMENT — ENCOUNTER SYMPTOMS
CONSTIPATION: 0
DIARRHEA: 0
NAUSEA: 0
ABDOMINAL PAIN: 1
VOMITING: 0

## 2023-05-18 NOTE — PROGRESS NOTES
Yariel Morelos (:  1960) is a 60 y.o. female,Established patient, here for evaluation of the following chief complaint(s):  Urinary Frequency (Sxs )         ASSESSMENT/PLAN:  1. Flank pain  -     POCT Urinalysis no Micro  2. Leukocytes in urine  -     Culture, Urine  3. Suspected UTI  -     sulfamethoxazole-trimethoprim (BACTRIM DS;SEPTRA DS) 800-160 MG per tablet; Take 1 tablet by mouth 2 times daily for 7 days, Disp-14 tablet, R-0Normal    -Discussed doing vaginal swab to look for BV, patient declines at this time.   -Will start antibiotics as patient's symptoms consistent with UTI, patient aware that if culture is negative she will be asked to stop taking them. If culture returns positive it is possible antibiotics may be changed to better treat what bacteria grows. Patient verbalized understanding   -Drink plenty of water   -Tylenol if needed for pain per package directions   -Avoid bladder irritants such as coffee, chocolate   -Take antibiotic with food, probiotic daily   -Finish full course of medication unless told other wise by the office   -Notify office if symptoms worsen or do not improve       Return if symptoms worsen or fail to improve. Subjective   SUBJECTIVE/OBJECTIVE:  Reports symptoms started three days ago with vaginal\"tingling\", cloudy urine  Symptoms have worsened since then   Has not taken anything at home for her symptoms         Review of Systems   Constitutional:  Negative for chills and fever. Gastrointestinal:  Positive for abdominal pain. Negative for constipation, diarrhea, nausea and vomiting. \"Discomfort around bladder\"      Genitourinary:  Positive for flank pain and vaginal pain. Negative for dysuria, frequency, urgency and vaginal discharge. Cloudy urine   Denies any vaginal itching           Objective   Physical Exam  Vitals reviewed. Constitutional:       General: She is not in acute distress. Appearance: She is not ill-appearing.    HENT:

## 2023-05-20 LAB — BACTERIA UR CULT: NORMAL

## 2023-10-18 ENCOUNTER — OFFICE VISIT (OUTPATIENT)
Dept: FAMILY MEDICINE CLINIC | Age: 63
End: 2023-10-18
Payer: COMMERCIAL

## 2023-10-18 VITALS
BODY MASS INDEX: 32.3 KG/M2 | WEIGHT: 201 LBS | SYSTOLIC BLOOD PRESSURE: 122 MMHG | HEART RATE: 72 BPM | OXYGEN SATURATION: 99 % | DIASTOLIC BLOOD PRESSURE: 86 MMHG | HEIGHT: 66 IN | TEMPERATURE: 98.4 F

## 2023-10-18 DIAGNOSIS — J01.40 ACUTE NON-RECURRENT PANSINUSITIS: Primary | ICD-10-CM

## 2023-10-18 PROCEDURE — 99213 OFFICE O/P EST LOW 20 MIN: CPT | Performed by: NURSE PRACTITIONER

## 2023-10-18 PROCEDURE — 3017F COLORECTAL CA SCREEN DOC REV: CPT | Performed by: NURSE PRACTITIONER

## 2023-10-18 PROCEDURE — G8427 DOCREV CUR MEDS BY ELIG CLIN: HCPCS | Performed by: NURSE PRACTITIONER

## 2023-10-18 PROCEDURE — G8417 CALC BMI ABV UP PARAM F/U: HCPCS | Performed by: NURSE PRACTITIONER

## 2023-10-18 PROCEDURE — G8484 FLU IMMUNIZE NO ADMIN: HCPCS | Performed by: NURSE PRACTITIONER

## 2023-10-18 PROCEDURE — 1036F TOBACCO NON-USER: CPT | Performed by: NURSE PRACTITIONER

## 2023-10-18 RX ORDER — AMOXICILLIN AND CLAVULANATE POTASSIUM 875; 125 MG/1; MG/1
1 TABLET, FILM COATED ORAL 2 TIMES DAILY
Qty: 14 TABLET | Refills: 0 | Status: SHIPPED | OUTPATIENT
Start: 2023-10-18 | End: 2023-10-25

## 2023-10-18 ASSESSMENT — ENCOUNTER SYMPTOMS
DIARRHEA: 0
SHORTNESS OF BREATH: 0
SINUS PAIN: 0
SORE THROAT: 1
ABDOMINAL PAIN: 0
COUGH: 1
SINUS PRESSURE: 0
NAUSEA: 0
VOMITING: 0
RHINORRHEA: 0

## 2023-10-18 NOTE — PROGRESS NOTES
Ariel Campbell (:  1960) is a 60 y.o. female,Established patient, here for evaluation of the following chief complaint(s):  Cough (Sxs x 6 days. Post nasal drip) and Pharyngitis (From coughing)         ASSESSMENT/PLAN:  1. Acute non-recurrent pansinusitis  -     amoxicillin-clavulanate (AUGMENTIN) 875-125 MG per tablet; Take 1 tablet by mouth 2 times daily for 7 days, Disp-14 tablet, R-0Normal  -Medication risk and benefits discussed, take antibiotics with food. -Discussed vaccination options, risk, benefits and prevention. Pt plans to get COVID booster once symptoms resolve.  -Discussed importance of preventing spread of infection and wearing mask in public until symptoms resolve. Offered to write work note, pt declined at this time.   -Discussed proper cleaning techniques for ear canals, discussed the use of tea tree oil and sweet oil. Drink plenty of fluids   Get plenty of rest  Finish course of antibiotics   Take medications as prescribed   Go to nearest ER if develop shortness of breath, chest pain or significant worsening of symptoms   Notify office if symptoms worsen or do not improve   Flonase daily, normal saline nasal spray, saline rinse  Tylenol or Ibuprofen as needed   Humidifier or steamy showers   Delsym or Robitussin for cough   -Return to office if symptoms do not improve. Return if symptoms worsen or fail to improve. Subjective   SUBJECTIVE/OBJECTIVE:  Presents with sore throat and coughing x6 days. Productive cough with green phlegm. Pt reports returning from [facility] 10/9 and started to feel cold symptoms coming on. Recently vaccinated with flu shot 2023. Was scheduled for COVID booster but then became sick. Denies SOB, fatigue, chills, fever. Taking sudafed, mucinex, theraflu, alcazelter flu and cold medicine at home. Denies hx of COPD, Emphysema, bronchitis, asthma or PNA. Cough  Associated symptoms include postnasal drip and a sore throat.  Pertinent

## 2023-10-18 NOTE — PATIENT INSTRUCTIONS
Drink plenty of fluids   Get plenty of rest  Finish course of antibiotics   Take medications as prescribed   Go to nearest ER if develop shortness of breath, chest pain or significant worsening of symptoms   Notify office if symptoms worsen or do not improve   Flonase daily, normal saline nasal spray, saline rinse  Tylenol or Ibuprofen as needed   Humidifier or steamy showers   Delsym or Robitussin for cough

## 2024-03-21 NOTE — TELEPHONE ENCOUNTER
Please schedule [facility]  Outpatient Physical Therapy   Treatment Note        Date: 3/21/2024  Patient: Dhara Wyman  : 1944   Confirmed: Yes  MRN: [id number]  Referring Provider: Kenneth Anderson MD      Medical Diagnosis: Personal history of (healed) traumatic fracture [Z87.81]      Treatment Diagnosis: L ankle, reduced ROM, reduced strength, decreased stability and balance, reduced stair and gait mechanics    Visit Information:  Insurance: Payor: [facility] MEDICARE / Plan: [facility] MEDICARE ADVANTAGE / Product Type: *No Product type* /   PT Visit Information  Onset Date: 24  PT Insurance Information: [employer] Medicare  Total # of Visits Approved:  (BMN)  Total # of Visits to Date: 4  Plan of Care/Certification Expiration Date: 24  No Show: 0  Progress Note Due Date: 24  Canceled Appointment: 1  Progress Note Counter:     Subjective Information:  Subjective: Patient reports without WW and boot, using lace-up brace. Denies pain in ankle, just in toes. States she will likely just come until next week due to co-pay.  HEP Compliance:  [x] Good [] Fair [] Poor [] Reports not doing due to:    Pain Screening  Patient Currently in Pain: Denies    Treatment:  Exercises:  Exercises  Exercise 1: 4-way ankle RTB x10 reps, each  Exercise 2: calf str with strap 5 reps x 30 sec holds  Exercise 4: standing heel raises x10 reps  Exercise 8: step-ups F/L/R 4\" step x10 reps, each  Exercise 9: PROM x 5  Exercise 11: 4-way Baps board x10 reps, L2  Exercise 12: 3-way wobble x20 reps  Exercise 13: 2\" heel taps (stabilizing on LLE) x15 reps  Exercise 20: HEP: continue current + standing heel raises and 4-way ankle    *Indicates exercise, modality, or manual techniques to be initiated when appropriate    Objective Measures:     STG 1 Current Status:: 3/21: reports avg pain of 3/10     LTG 2 Current Status:: 3/21: patient ambulating without AD and with lace-up brace, slower olman and Reduced WBing on L foot demonstrating

## (undated) DEVICE — FORCEPS BX L240CM DIA2.4MM L NDL RAD JAW 4 133340